# Patient Record
Sex: FEMALE | Race: WHITE | Employment: FULL TIME | URBAN - METROPOLITAN AREA
[De-identification: names, ages, dates, MRNs, and addresses within clinical notes are randomized per-mention and may not be internally consistent; named-entity substitution may affect disease eponyms.]

---

## 2017-02-10 ENCOUNTER — TELEPHONE (OUTPATIENT)
Dept: FAMILY MEDICINE | Facility: CLINIC | Age: 57
End: 2017-02-10

## 2017-02-10 ENCOUNTER — OFFICE VISIT (OUTPATIENT)
Dept: FAMILY MEDICINE | Facility: CLINIC | Age: 57
End: 2017-02-10
Payer: COMMERCIAL

## 2017-02-10 VITALS
HEIGHT: 66 IN | BODY MASS INDEX: 20.09 KG/M2 | HEART RATE: 74 BPM | WEIGHT: 125 LBS | TEMPERATURE: 98.4 F | OXYGEN SATURATION: 97 % | SYSTOLIC BLOOD PRESSURE: 130 MMHG | DIASTOLIC BLOOD PRESSURE: 79 MMHG

## 2017-02-10 DIAGNOSIS — Z86.19 HISTORY OF TRAVELER'S DIARRHEA: Primary | ICD-10-CM

## 2017-02-10 DIAGNOSIS — R09.81 NASAL CONGESTION: ICD-10-CM

## 2017-02-10 PROCEDURE — 99213 OFFICE O/P EST LOW 20 MIN: CPT | Performed by: PHYSICIAN ASSISTANT

## 2017-02-10 RX ORDER — AZITHROMYCIN 250 MG/1
TABLET, FILM COATED ORAL
Qty: 6 TABLET | Refills: 0 | Status: SHIPPED | OUTPATIENT
Start: 2017-02-10 | End: 2018-01-08

## 2017-02-10 NOTE — MR AVS SNAPSHOT
"              After Visit Summary   2/10/2017    Angi Ramos    MRN: [id number]           Patient Information     Date Of Birth          1960        Visit Information        Provider Department      2/10/2017 4:30 PM Neha Argueta PA-C [facility]        Today's Diagnoses     History of traveler's diarrhea    -  1     Nasal congestion            Follow-ups after your visit        Who to contact     If you have questions or need follow up information about today's clinic visit or your schedule please contact [facility] directly at [phone].  Normal or non-critical lab and imaging results will be communicated to you by FlexMinderhart, letter or phone within 4 business days after the clinic has received the results. If you do not hear from us within 7 days, please contact the clinic through FlexMinderhart or phone. If you have a critical or abnormal lab result, we will notify you by phone as soon as possible.  Submit refill requests through Shook or call your pharmacy and they will forward the refill request to us. Please allow 3 business days for your refill to be completed.          Additional Information About Your Visit        MyChart Information     Shook lets you send messages to your doctor, view your test results, renew your prescriptions, schedule appointments and more. To sign up, go to [email or web address] . Click on \"Log in\" on the left side of the screen, which will take you to the Welcome page. Then click on \"Sign up Now\" on the right side of the page.     You will be asked to enter the access code listed below, as well as some personal information. Please follow the directions to create your username and password.     Your access code is: [email or web address]  Expires: 2017  4:45 PM     Your access code will  in 90 days. If you need help or a new code, please call your [facility] or [phone].        Care EveryWhere ID     This is your Care EveryWhere " "ID. This could be used by other organizations to access your [facility] medical records  [id number]        Your Vitals Were     Pulse Temperature Height BMI (Body Mass Index) Pulse Oximetry Last Period    74 98.4  F (36.9  C) (Tympanic) 5' 6\" (1.676 m) 20.19 kg/m2 97% 10/10/2016    Breastfeeding?                   No            Blood Pressure from Last 3 Encounters:   02/10/17 130/79   10/20/16 104/65   09/08/16 112/72    Weight from Last 3 Encounters:   02/10/17 125 lb (56.7 kg)   10/20/16 130 lb (58.968 kg)   09/08/16 131 lb 4.8 oz (59.557 kg)              Today, you had the following     No orders found for display         Today's Medication Changes          These changes are accurate as of: 2/10/17  4:45 PM.  If you have any questions, ask your nurse or doctor.               Start taking these medicines.        Dose/Directions    azithromycin 250 MG tablet   Commonly known as:  ZITHROMAX   Used for:  History of traveler's diarrhea, Nasal congestion   Started by:  Neha Argueta PA-C        Two tablets first day, then one tablet daily for four days.   Quantity:  6 tablet   Refills:  0         Stop taking these medicines if you haven't already. Please contact your care team if you have questions.     order for DME   Stopped by:  Neha Argueta PA-C                Where to get your medicines      These medications were sent to [facility]/pharmacy #[id number] - [facility] [address] [address]     Phone:  [phone]    - azithromycin 250 MG tablet             Primary Care Provider Office Phone # Fax #    Cduztntq Fede Lima [phone]       [facility] [address] [facility] [address]  [facility] [address]        Thank you!     Thank you for choosing [facility]  for your care. Our goal is always to provide you with excellent care. Hearing back from our patients is one way we can continue to improve our services. Please take a few " minutes to complete the written survey that you may receive in the mail after your visit with us. Thank you!             Your Updated Medication List - Protect others around you: Learn how to safely use, store and throw away your medicines at www.disposemymeds.org.          This list is accurate as of: 2/10/17  4:45 PM.  Always use your most recent med list.                   Brand Name Dispense Instructions for use    azithromycin 250 MG tablet    ZITHROMAX    6 tablet    Two tablets first day, then one tablet daily for four days.       COMBIPATCH 0.05-0.14 MG/DAY BIW patch   Generic drug:  estradiol-norethindrone     24 patch    PLACE ONE PATCH ON SKIN TWICE A WEEK

## 2017-02-10 NOTE — TELEPHONE ENCOUNTER
Reason for call:  Patient reporting a symptom    Symptom or request: Congested in head/ears runny nose  Little bit of sore throat    Duration (how long have symptoms been present): few days      Additional comments: Pt just returned from [facility]- Pt also   Has apt today 2/10 at 4:30 with Neha Argueta    Phone Number patient can be reached at:  Cell number on file:    Telephone Information:   Mobile [phone]       Best Time:  anytime    Can we leave a detailed message on this number:  YES    Call taken on 2/10/2017 at 1:42 PM by Krystle Joe

## 2017-02-10 NOTE — TELEPHONE ENCOUNTER
PCP: ALVARADO    Just came back from [facility] yesterday.  Onset of sinus congestion 2 days ago.   Sinus drainage is clear.  Has pain behind the eyes and in the cheeks, mostly on the left side. Rates the pain 8/10.   Denies fever  Tired, worn out.   Sore throat this morning, better now.  Denies cough  Taking Advil decongestant which helps briefly.  Took a Benadyl when she was out of the country.    Has appointment scheduled with TEAM at 4:30 today.    Marifer Kirby RN

## 2017-02-10 NOTE — PROGRESS NOTES
"HPI: This is a 57 yo female here with nasal congestion and sinus pressure x 2d  She returned from [facility] yesterday and sxs started then  She feels her sxs due to the pollution in the city  She is taking advil for sinus pain  Also taking benadryl which doesn't help for very long  She is having clear nasal drainage  Denies cough, sore throat or fever.  Requests abx to carry when she return to [facility] in case of travelers diarrhea.    No past medical history on file.  No past surgical history on file.  Social History   Substance Use Topics     Smoking status: Never Smoker      Smokeless tobacco: Never Used     Alcohol Use: Yes      Comment: 1 glass of wine a day     Current Outpatient Prescriptions   Medication Sig Dispense Refill     azithromycin (ZITHROMAX) 250 MG tablet Two tablets first day, then one tablet daily for four days. 6 tablet 0     COMBIPATCH 0.05-0.14 MG/DAY PLACE ONE PATCH ON SKIN TWICE A WEEK 24 patch 3     Allergies   Allergen Reactions     Ciprofloxacin Cramps     Penicillins      Amoxicillin Rash     Clindamycin Hcl Rash       PHYSICAL EXAM:    /79 mmHg  Pulse 74  Temp(Src) 98.4  F (36.9  C) (Tympanic)  Ht 5' 6\" (1.676 m)  Wt 125 lb (56.7 kg)  BMI 20.19 kg/m2  SpO2 97%  LMP 10/10/2016  Breastfeeding? No    Patient appears non toxic  Ears: bilat serous effusions  Nose: no erythema, + clear drainage  Throat: no erythema or exudates.  Lungs: CTA bilat  Heart: RRR    Assessment and Plan:     (Z86.19) History of traveler's diarrhea  (primary encounter diagnosis)  Comment:   Plan: azithromycin (ZITHROMAX) 250 MG tablet        1000mg x one dose prn    (R09.81) Nasal congestion  Comment: suspect due to irritants  Plan: Recd zyrtec 10mg qhs, sudafed prn, advil for sinus and ear pressure. Add Flonase if needed              Neha Argueta PA-C      "

## 2018-01-08 ENCOUNTER — OFFICE VISIT (OUTPATIENT)
Dept: FAMILY MEDICINE | Facility: CLINIC | Age: 58
End: 2018-01-08
Payer: COMMERCIAL

## 2018-01-08 VITALS
WEIGHT: 124.8 LBS | OXYGEN SATURATION: 98 % | BODY MASS INDEX: 20.06 KG/M2 | HEIGHT: 66 IN | DIASTOLIC BLOOD PRESSURE: 77 MMHG | SYSTOLIC BLOOD PRESSURE: 135 MMHG | TEMPERATURE: 97.3 F | HEART RATE: 58 BPM

## 2018-01-08 DIAGNOSIS — R30.0 DYSURIA: ICD-10-CM

## 2018-01-08 DIAGNOSIS — N39.0 URINARY TRACT INFECTION WITHOUT HEMATURIA, SITE UNSPECIFIED: Primary | ICD-10-CM

## 2018-01-08 LAB
ALBUMIN UR-MCNC: ABNORMAL MG/DL
APPEARANCE UR: CLEAR
BACTERIA #/AREA URNS HPF: ABNORMAL /HPF
BILIRUB UR QL STRIP: NEGATIVE
COLOR UR AUTO: YELLOW
GLUCOSE UR STRIP-MCNC: NEGATIVE MG/DL
HGB UR QL STRIP: ABNORMAL
KETONES UR STRIP-MCNC: NEGATIVE MG/DL
LEUKOCYTE ESTERASE UR QL STRIP: ABNORMAL
NITRATE UR QL: NEGATIVE
NON-SQ EPI CELLS #/AREA URNS LPF: ABNORMAL /LPF
PH UR STRIP: 6.5 PH (ref 5–7)
RBC #/AREA URNS AUTO: ABNORMAL /HPF
SOURCE: ABNORMAL
SP GR UR STRIP: 1.01 (ref 1–1.03)
UROBILINOGEN UR STRIP-ACNC: 0.2 EU/DL (ref 0.2–1)
WBC #/AREA URNS AUTO: ABNORMAL /HPF

## 2018-01-08 PROCEDURE — 87186 SC STD MICRODIL/AGAR DIL: CPT | Performed by: NURSE PRACTITIONER

## 2018-01-08 PROCEDURE — 81001 URINALYSIS AUTO W/SCOPE: CPT | Performed by: NURSE PRACTITIONER

## 2018-01-08 PROCEDURE — 87088 URINE BACTERIA CULTURE: CPT | Performed by: NURSE PRACTITIONER

## 2018-01-08 PROCEDURE — 87086 URINE CULTURE/COLONY COUNT: CPT | Performed by: NURSE PRACTITIONER

## 2018-01-08 PROCEDURE — 99213 OFFICE O/P EST LOW 20 MIN: CPT | Performed by: NURSE PRACTITIONER

## 2018-01-08 RX ORDER — SULFAMETHOXAZOLE/TRIMETHOPRIM 800-160 MG
1 TABLET ORAL 2 TIMES DAILY
Qty: 6 TABLET | Refills: 0 | Status: SHIPPED | OUTPATIENT
Start: 2018-01-08 | End: 2018-01-11

## 2018-01-08 NOTE — PROGRESS NOTES
"HPI    URINARY TRACT SYMPTOMS  Onset: x 1 days     Description:   Painful urination (Dysuria): YES - lots of pressure   Blood in urine (Hematuria): YES  Delay in urine (Hesitency): no     Intensity: mild    Progression of Symptoms:  improving    Accompanying Signs & Symptoms:  Fever/chills: no   Flank pain no   Nausea and vomiting: no   Any vaginal symptoms: none  Abdominal/Pelvic Pain: no     History:   History of frequent UTI's: no   History of kidney stones: no   Sexually Active: Yes  Possibility of pregnancy: No    Precipitating factors:   None  Therapies Tried and outcome: Advil cold and sinus - shows mild improvement   Pt states that she travels a lot   She just had a new partner last weekend. Used protection.        History reviewed. No pertinent past medical history.  History reviewed. No pertinent surgical history.  Social History   Substance Use Topics     Smoking status: Never Smoker     Smokeless tobacco: Never Used     Alcohol use Yes      Comment: 1 glass of wine a day     No current outpatient prescriptions on file.     Allergies   Allergen Reactions     Ciprofloxacin Cramps     Penicillins      Amoxicillin Rash     Clindamycin Hcl Rash       Reviewed and updated as needed this visit by clinical staff and provider      ROS  Detailed as above       /77 (BP Location: Right arm, Patient Position: Chair, Cuff Size: Adult Regular)  Pulse 58  Temp 97.3  F (36.3  C) (Oral)  Ht 5' 6\" (1.676 m)  Wt 124 lb 12.8 oz (56.6 kg)  LMP  (Approximate)  SpO2 98%  Breastfeeding? No  BMI 20.14 kg/m2      Physical Exam   Constitutional: She is well-developed, well-nourished, and in no distress.   HENT:   Head: Normocephalic.   Eyes: Conjunctivae are normal.   Pulmonary/Chest: Effort normal.   Neurological: She is alert.   Psychiatric: Mood and affect normal.   Vitals reviewed.      Assessment and Plan:       ICD-10-CM    1. Urinary tract infection without hematuria, site unspecified N39.0 " sulfamethoxazole-trimethoprim (BACTRIM DS/SEPTRA DS) 800-160 MG per tablet   2. Dysuria R30.0 *UA reflex to Microscopic and Culture ([facility] [facility] (except [place] and [place])     Urine Culture Aerobic Bacterial     Urine Microscopic       Positive UA    Will treat   Push fluids   Discussed home hygiene   Culture pending   Call or rtc prn       Fredy Santana APRN, CNP  [facility]

## 2018-01-08 NOTE — PATIENT INSTRUCTIONS
Understanding Urinary Tract Infections (UTIs)  Most UTIs are caused by bacteria, although they may also be caused by viruses or fungi. Bacteria from the bowel are the most common source of infection. The infection may start because of any of the following:    Sexual activity. During sex, bacteria can travel from the penis, vagina, or rectum into the urethra.     Bacteria on the skin outside the rectum may travel into the urethra. This is more common in women since the rectum and urethra are closer to each other than in men. Wiping from front to back after using the toilet and keeping the area clean can help prevent germs from getting to the urethra.    Blockage of urine flow through the urinary tract. If urine sits too long, germs may start to grow out of control.      Parts of the urinary tract  The infection can occur in any part of the urinary tract.    The kidneys collect and store urine.    The ureters carry urine from the kidneys to the bladder.    The bladder holds urine until you are ready to let it out.    The urethra carries urine from the bladder out of the body. It is shorter in women, so bacteria can move through it more easily. The urethra is longer in men, so a UTI is less likely to reach the bladder or kidneys in men.  Date Last Reviewed: 1/1/2017 2000-2017 [employer]. [address] [place] [address], [place] [address]. All rights reserved. This information is not intended as a substitute for professional medical care. Always follow your healthcare professional's instructions.

## 2018-01-08 NOTE — NURSING NOTE
"Chief Complaint   Patient presents with     UTI       Initial /77 (BP Location: Right arm, Patient Position: Chair, Cuff Size: Adult Regular)  Pulse 58  Temp 97.3  F (36.3  C) (Oral)  Ht 5' 6\" (1.676 m)  Wt 124 lb 12.8 oz (56.6 kg)  LMP  (Approximate)  SpO2 98%  Breastfeeding? No  BMI 20.14 kg/m2 Estimated body mass index is 20.14 kg/(m^2) as calculated from the following:    Height as of this encounter: 5' 6\" (1.676 m).    Weight as of this encounter: 124 lb 12.8 oz (56.6 kg).  Medication Reconciliation: complete     Ijeoma Charles MA     "

## 2018-01-10 LAB
BACTERIA SPEC CULT: ABNORMAL
BACTERIA SPEC CULT: ABNORMAL
SPECIMEN SOURCE: ABNORMAL

## 2018-01-10 RX ORDER — CEPHALEXIN 500 MG/1
500 CAPSULE ORAL 4 TIMES DAILY
Qty: 12 CAPSULE | Refills: 0 | Status: SHIPPED | OUTPATIENT
Start: 2018-01-10 | End: 2018-01-13

## 2018-03-19 ENCOUNTER — OFFICE VISIT (OUTPATIENT)
Dept: FAMILY MEDICINE | Facility: CLINIC | Age: 58
End: 2018-03-19
Payer: COMMERCIAL

## 2018-03-19 VITALS
SYSTOLIC BLOOD PRESSURE: 128 MMHG | HEIGHT: 65 IN | TEMPERATURE: 98.5 F | OXYGEN SATURATION: 97 % | WEIGHT: 130 LBS | HEART RATE: 66 BPM | DIASTOLIC BLOOD PRESSURE: 76 MMHG | RESPIRATION RATE: 16 BRPM | BODY MASS INDEX: 21.66 KG/M2

## 2018-03-19 DIAGNOSIS — Z00.00 ROUTINE GENERAL MEDICAL EXAMINATION AT A HEALTH CARE FACILITY: Primary | ICD-10-CM

## 2018-03-19 DIAGNOSIS — R53.83 FATIGUE, UNSPECIFIED TYPE: ICD-10-CM

## 2018-03-19 LAB
ALBUMIN SERPL-MCNC: 4.2 G/DL (ref 3.4–5)
ALP SERPL-CCNC: 82 U/L (ref 40–150)
ALT SERPL W P-5'-P-CCNC: 18 U/L (ref 0–50)
ANION GAP SERPL CALCULATED.3IONS-SCNC: 9 MMOL/L (ref 3–14)
AST SERPL W P-5'-P-CCNC: 25 U/L (ref 0–45)
BILIRUB SERPL-MCNC: 0.4 MG/DL (ref 0.2–1.3)
BUN SERPL-MCNC: 12 MG/DL (ref 7–30)
CALCIUM SERPL-MCNC: 9.5 MG/DL (ref 8.5–10.1)
CHLORIDE SERPL-SCNC: 104 MMOL/L (ref 94–109)
CHOLEST SERPL-MCNC: 286 MG/DL
CO2 SERPL-SCNC: 26 MMOL/L (ref 20–32)
CREAT SERPL-MCNC: 0.63 MG/DL (ref 0.52–1.04)
ERYTHROCYTE [DISTWIDTH] IN BLOOD BY AUTOMATED COUNT: 14.3 % (ref 10–15)
GFR SERPL CREATININE-BSD FRML MDRD: >90 ML/MIN/1.7M2
GLUCOSE SERPL-MCNC: 88 MG/DL (ref 70–99)
HCT VFR BLD AUTO: 42.3 % (ref 35–47)
HDLC SERPL-MCNC: 94 MG/DL
HGB BLD-MCNC: 14.1 G/DL (ref 11.7–15.7)
LDLC SERPL CALC-MCNC: 182 MG/DL
MCH RBC QN AUTO: 30.2 PG (ref 26.5–33)
MCHC RBC AUTO-ENTMCNC: 33.3 G/DL (ref 31.5–36.5)
MCV RBC AUTO: 91 FL (ref 78–100)
NONHDLC SERPL-MCNC: 192 MG/DL
PLATELET # BLD AUTO: 405 10E9/L (ref 150–450)
POTASSIUM SERPL-SCNC: 4.4 MMOL/L (ref 3.4–5.3)
PROT SERPL-MCNC: 8 G/DL (ref 6.8–8.8)
RBC # BLD AUTO: 4.67 10E12/L (ref 3.8–5.2)
SODIUM SERPL-SCNC: 139 MMOL/L (ref 133–144)
T4 FREE SERPL-MCNC: 0.94 NG/DL (ref 0.76–1.46)
TRIGL SERPL-MCNC: 50 MG/DL
TSH SERPL DL<=0.005 MIU/L-ACNC: 4.6 MU/L (ref 0.4–4)
WBC # BLD AUTO: 10.2 10E9/L (ref 4–11)

## 2018-03-19 PROCEDURE — 86803 HEPATITIS C AB TEST: CPT | Performed by: NURSE PRACTITIONER

## 2018-03-19 PROCEDURE — 85027 COMPLETE CBC AUTOMATED: CPT | Performed by: NURSE PRACTITIONER

## 2018-03-19 PROCEDURE — 99396 PREV VISIT EST AGE 40-64: CPT | Performed by: NURSE PRACTITIONER

## 2018-03-19 PROCEDURE — 84439 ASSAY OF FREE THYROXINE: CPT | Performed by: NURSE PRACTITIONER

## 2018-03-19 PROCEDURE — 84443 ASSAY THYROID STIM HORMONE: CPT | Performed by: NURSE PRACTITIONER

## 2018-03-19 PROCEDURE — 80061 LIPID PANEL: CPT | Performed by: NURSE PRACTITIONER

## 2018-03-19 PROCEDURE — 36415 COLL VENOUS BLD VENIPUNCTURE: CPT | Performed by: NURSE PRACTITIONER

## 2018-03-19 PROCEDURE — 80053 COMPREHEN METABOLIC PANEL: CPT | Performed by: NURSE PRACTITIONER

## 2018-03-19 NOTE — PROGRESS NOTES
SUBJECTIVE:   CC: Angi Ramos is an 57 year old woman who presents for preventive health visit.     Physical   Annual:     Getting at least 3 servings of Calcium per day::  Yes    Bi-annual eye exam::  Yes    Dental care twice a year::  Yes    Sleep apnea or symptoms of sleep apnea::  None    Diet::  Regular (no restrictions)    Frequency of exercise::  4-5 days/week    Duration of exercise::  Greater than 60 minutes    Taking medications regularly::  No    Barriers to taking medications::  Other          Patient was 20 minutes late for her appointment. Here for a complete physical. Travels doing mission trips and has not been feeling well for the past two months. Has a lot of stress with adult daughter who is mentally ill. Active working out daily and feels she has had a fever for two months. Is concerned about hepatitis C.           Today's PHQ-2 Score:   PHQ-2 ( 1999 Pf[employer]) 3/19/2018   Q1: Little interest or pleasure in doing things 0   Q2: Feeling down, depressed or hopeless 1   PHQ-2 Score 1   Q1: Little interest or pleasure in doing things Not at all   Q2: Feeling down, depressed or hopeless Several days   PHQ-2 Score 1       Abuse: Current or Past(Physical, Sexual or Emotional)- No  Do you feel safe in your environment - Yes    Social History   Substance Use Topics     Smoking status: Never Smoker     Smokeless tobacco: Never Used     Alcohol use Yes      Comment: 1 glass of wine a day     No flowsheet data found.    Reviewed orders with patient.  Reviewed health maintenance and updated orders accordingly - Yes      Patient over age 50, mutual decision to screen reflected in health maintenance.    Pertinent mammograms are reviewed under the imaging tab.  History of abnormal Pap smear: NO - age 30-65 PAP every 5 years with negative HPV co-testing recommended    Reviewed and updated as needed this visit by clinical staff  Tobacco  Allergies  Meds  Problems  Med Hx  Surg Hx  Fam Hx  Soc Hx  "         Reviewed and updated as needed this visit by Provider  Allergies  Meds  Problems            Review of Systems  C: NEGATIVE for fever, chills, change in weight  I: NEGATIVE for worrisome rashes, moles or lesions  E: NEGATIVE for vision changes or irritation  ENT: NEGATIVE for ear, mouth and throat problems  R: NEGATIVE for significant cough or SOB  B: NEGATIVE for masses, tenderness or discharge  CV: NEGATIVE for chest pain, palpitations or peripheral edema  GI: NEGATIVE for nausea, abdominal pain, heartburn, or change in bowel habits  : NEGATIVE for unusual urinary or vaginal symptoms. No vaginal bleeding.  M: NEGATIVE for significant arthralgias or myalgia  N: NEGATIVE for weakness, dizziness or paresthesias  P: NEGATIVE for changes in mood or affect      OBJECTIVE:   /76 (BP Location: Right arm, Patient Position: Chair, Cuff Size: Adult Regular)  Pulse 66  Temp 98.5  F (36.9  C) (Oral)  Resp 16  Ht 5' 5\" (1.651 m)  Wt 130 lb (59 kg)  LMP 10/10/2016  SpO2 97%  Breastfeeding? No  BMI 21.63 kg/m2  Physical Exam  GENERAL: healthy, alert and no distress  EYES: Eyes grossly normal to inspection, PERRL and conjunctivae and sclerae normal  HENT: ear canals and TM's normal, nose and mouth without ulcers or lesions  NECK: no adenopathy, no asymmetry, masses, or scars and thyroid normal to palpation  RESP: lungs clear to auscultation - no rales, rhonchi or wheezes  CV: regular rate and rhythm, normal S1 S2, no S3 or S4, no murmur, click or rub, no peripheral edema and peripheral pulses strong  ABDOMEN: soft, nontender, no hepatosplenomegaly, no masses and bowel sounds normal  MS: no gross musculoskeletal defects noted, no edema  SKIN: no suspicious lesions or rashes  PSYCH: mentation appears normal and anxious    ASSESSMENT/PLAN:   1. Routine general medical examination at a health care facility  Fasting labs drawn today.   - Comprehensive metabolic panel  - Lipid panel reflex to direct LDL " "Fasting  -  2. Fatigue, unspecified type   TSH with free T4 reflex  - Hepatitis C antibody  - CBC with platelets  - T4 free        COUNSELING:  Reviewed preventive health counseling, as reflected in patient instructions       Regular exercise       Healthy diet/nutrition         reports that she has never smoked. She has never used smokeless tobacco.    Estimated body mass index is 21.63 kg/(m^2) as calculated from the following:    Height as of this encounter: 5' 5\" (1.651 m).    Weight as of this encounter: 130 lb (59 kg).       Counseling Resources:  ATP IV Guidelines  Pooled Cohorts Equation Calculator  Breast Cancer Risk Calculator  FRAX Risk Assessment  ICSI Preventive Guidelines  Dietary Guidelines for Americans, 2010  [employer] App's MyPlate  ASA Prophylaxis  Lung CA Screening    Jeannette Chavez NP  [facility]  Answers for HPI/ROS submitted by the patient on 3/19/2018   PHQ-2 Score: 1    "

## 2018-03-19 NOTE — NURSING NOTE
"Chief Complaint   Patient presents with     Physical     only tea today, not feeling well for 1 month       Initial /76 (BP Location: Right arm, Patient Position: Chair, Cuff Size: Adult Regular)  Pulse 66  Temp 98.5  F (36.9  C) (Oral)  Resp 16  Ht 5' 5\" (1.651 m)  Wt 130 lb (59 kg)  LMP 10/10/2016  SpO2 97%  Breastfeeding? No  BMI 21.63 kg/m2 Estimated body mass index is 21.63 kg/(m^2) as calculated from the following:    Height as of this encounter: 5' 5\" (1.651 m).    Weight as of this encounter: 130 lb (59 kg).  Medication Reconciliation: vanessa Parikh CMA      "

## 2018-03-19 NOTE — LETTER
[facility]          [address] [place] [address]                                                                                                       [phone]      Angi Ramos  [address]  [facility] [address]      Dear Reena Whitley is a copy of the results that we discussed over the phone.      Thank you for choosing [facility]. We appreciate the opportunity to serve you and look forward to supporting your healthcare needs in the future.    If you have any questions or concerns, please call me or my nurse at [phone].        Sincerely,          Jeannette Chavez NP                        Results for orders placed or performed in visit on 03/19/18   Comprehensive metabolic panel   Result Value Ref Range    Sodium 139 133 - 144 mmol/L    Potassium 4.4 3.4 - 5.3 mmol/L    Chloride 104 94 - 109 mmol/L    Carbon Dioxide 26 20 - 32 mmol/L    Anion Gap 9 3 - 14 mmol/L    Glucose 88 70 - 99 mg/dL    Urea Nitrogen 12 7 - 30 mg/dL    Creatinine 0.63 0.52 - 1.04 mg/dL    GFR Estimate >90 >60 mL/min/1.7m2    GFR Estimate If Black >90 >60 mL/min/1.7m2    Calcium 9.5 8.5 - 10.1 mg/dL    Bilirubin Total 0.4 0.2 - 1.3 mg/dL    Albumin 4.2 3.4 - 5.0 g/dL    Protein Total 8.0 6.8 - 8.8 g/dL    Alkaline Phosphatase 82 40 - 150 U/L    ALT 18 0 - 50 U/L    AST 25 0 - 45 U/L   Lipid panel reflex to direct LDL Fasting   Result Value Ref Range    Cholesterol 286 (H) <200 mg/dL    Triglycerides 50 <150 mg/dL    HDL Cholesterol 94 >49 mg/dL    LDL Cholesterol Calculated 182 (H) <100 mg/dL    Non HDL Cholesterol 192 (H) <130 mg/dL   TSH with free T4 reflex   Result Value Ref Range    TSH 4.60 (H) 0.40 - 4.00 mU/L   Hepatitis C antibody   Result Value Ref Range    Hepatitis C Antibody Nonreactive NR^Nonreactive   CBC with platelets   Result Value Ref Range    WBC 10.2 4.0 - 11.0 10e9/L    RBC Count 4.67 3.8 - 5.2 10e12/L    Hemoglobin 14.1 11.7 - 15.7  g/dL    Hematocrit 42.3 35.0 - 47.0 %    MCV 91 78 - 100 fl    MCH 30.2 26.5 - 33.0 pg    MCHC 33.3 31.5 - 36.5 g/dL    RDW 14.3 10.0 - 15.0 %    Platelet Count 405 150 - 450 10e9/L   T4 free   Result Value Ref Range    T4 Free 0.94 0.76 - 1.46 ng/dL

## 2018-03-20 LAB — HCV AB SERPL QL IA: NONREACTIVE

## 2018-04-11 ENCOUNTER — TELEPHONE (OUTPATIENT)
Dept: NURSING | Facility: CLINIC | Age: 58
End: 2018-04-11

## 2018-04-11 NOTE — TELEPHONE ENCOUNTER
"Pt calling stating over the last couple days her eye has become swollen and black and blue \"like I got punched in the eye.\"  She does have c/o sinus drainage and feels this could be related.  Advised that she needs to be seen due to the swelling on one side and bruising to rule out an infection like cellulitis.  Pt was not pleased with this recommended and wants to be seen before 3 pm.  She is currently leaving [place] and works in [employer].  Advised she can be seen in the [place] or [facility] but unsure of other [facility] with her current location.  \"Can I be seen in a minute clinic?\"  Advised yes but again unsure of what location would be convenient for her due to her current location.    Brittany Albright RN, BSN    "

## 2018-11-14 ENCOUNTER — APPOINTMENT (OUTPATIENT)
Dept: GENERAL RADIOLOGY | Facility: CLINIC | Age: 58
End: 2018-11-14
Attending: NURSE PRACTITIONER
Payer: COMMERCIAL

## 2018-11-14 ENCOUNTER — HOSPITAL ENCOUNTER (EMERGENCY)
Facility: CLINIC | Age: 58
Discharge: HOME | End: 2018-11-14
Attending: NURSE PRACTITIONER | Admitting: NURSE PRACTITIONER
Payer: COMMERCIAL

## 2018-11-14 VITALS
DIASTOLIC BLOOD PRESSURE: 90 MMHG | OXYGEN SATURATION: 95 % | SYSTOLIC BLOOD PRESSURE: 127 MMHG | HEIGHT: 65 IN | WEIGHT: 120 LBS | BODY MASS INDEX: 19.99 KG/M2 | RESPIRATION RATE: 13 BRPM | TEMPERATURE: 98.4 F | HEART RATE: 69 BPM

## 2018-11-14 DIAGNOSIS — S52.502A CLOSED FRACTURE OF DISTAL END OF LEFT RADIUS, UNSPECIFIED FRACTURE MORPHOLOGY, INITIAL ENCOUNTER: ICD-10-CM

## 2018-11-14 PROCEDURE — 96376 TX/PRO/DX INJ SAME DRUG ADON: CPT

## 2018-11-14 PROCEDURE — 25600 CLTX DST RDL FX/EPHYS SEP WO: CPT | Mod: LT

## 2018-11-14 PROCEDURE — 96374 THER/PROPH/DIAG INJ IV PUSH: CPT

## 2018-11-14 PROCEDURE — 96375 TX/PRO/DX INJ NEW DRUG ADDON: CPT

## 2018-11-14 PROCEDURE — 73110 X-RAY EXAM OF WRIST: CPT | Mod: LT

## 2018-11-14 PROCEDURE — 99285 EMERGENCY DEPT VISIT HI MDM: CPT | Mod: 25

## 2018-11-14 PROCEDURE — 73080 X-RAY EXAM OF ELBOW: CPT | Mod: LT

## 2018-11-14 PROCEDURE — 25000128 H RX IP 250 OP 636: Performed by: NURSE PRACTITIONER

## 2018-11-14 RX ORDER — HYDROCODONE BITARTRATE AND ACETAMINOPHEN 5; 325 MG/1; MG/1
1 TABLET ORAL EVERY 6 HOURS PRN
Qty: 10 TABLET | Refills: 0 | Status: SHIPPED | OUTPATIENT
Start: 2018-11-14

## 2018-11-14 RX ORDER — ONDANSETRON 2 MG/ML
4 INJECTION INTRAMUSCULAR; INTRAVENOUS ONCE
Status: COMPLETED | OUTPATIENT
Start: 2018-11-14 | End: 2018-11-14

## 2018-11-14 RX ORDER — ASPIRIN 81 MG
100 TABLET, DELAYED RELEASE (ENTERIC COATED) ORAL DAILY
Qty: 60 TABLET | Refills: 1 | Status: SHIPPED | OUTPATIENT
Start: 2018-11-14

## 2018-11-14 RX ORDER — MORPHINE SULFATE 2 MG/ML
2 INJECTION, SOLUTION INTRAMUSCULAR; INTRAVENOUS
Status: DISCONTINUED | OUTPATIENT
Start: 2018-11-14 | End: 2018-11-15 | Stop reason: HOSPADM

## 2018-11-14 RX ORDER — MORPHINE SULFATE 4 MG/ML
4 INJECTION, SOLUTION INTRAMUSCULAR; INTRAVENOUS ONCE
Status: COMPLETED | OUTPATIENT
Start: 2018-11-14 | End: 2018-11-14

## 2018-11-14 RX ADMIN — MORPHINE SULFATE 4 MG: 4 INJECTION INTRAVENOUS at 21:55

## 2018-11-14 RX ADMIN — ONDANSETRON 4 MG: 2 INJECTION INTRAMUSCULAR; INTRAVENOUS at 21:55

## 2018-11-14 RX ADMIN — MORPHINE SULFATE 2 MG: 2 INJECTION, SOLUTION INTRAMUSCULAR; INTRAVENOUS at 22:46

## 2018-11-14 ASSESSMENT — ENCOUNTER SYMPTOMS
ARTHRALGIAS: 1
HEADACHES: 0
NECK PAIN: 0
NECK STIFFNESS: 0
WOUND: 0
MYALGIAS: 1
JOINT SWELLING: 1

## 2018-11-14 NOTE — LETTER
November 14, 2018      To Whom It May Concern:      Angi Ramos was seen in our Emergency Department today, 11/14/18. Please excuse Angi from work on 11/15, 11/16, and 11/17 due to injury.  She may return to work when improved.    Sincerely,        China Minor, CNP

## 2018-11-14 NOTE — ED AVS SNAPSHOT
Emergency Department    [address] [facility] [address]    Phone:  [phone]    Fax:  [phone]                                       Angi Ramos   MRN: [id number]    Department:   Emergency Department   Date of Visit:  11/14/2018           After Visit Summary Signature Page     I have received my discharge instructions, and my questions have been answered. I have discussed any challenges I see with this plan with the nurse or doctor.    ..........................................................................................................................................  Patient/Patient Representative Signature      ..........................................................................................................................................  Patient Representative Print Name and Relationship to Patient    ..................................................               ................................................  Date                                   Time    ..........................................................................................................................................  Reviewed by Signature/Title    ...................................................              ..............................................  Date                                               Time          22EPIC Rev 08/18

## 2018-11-14 NOTE — ED AVS SNAPSHOT
Emergency Department    [address] [facility] [address]    Phone:  [phone]    Fax:  [phone]                                       Angi Ramos   MRN: [id number]    Department:   Emergency Department   Date of Visit:  11/14/2018           Patient Information     Date Of Birth          1960        Your diagnoses for this visit were:     Closed fracture of distal end of left radius, unspecified fracture morphology, initial encounter There is a fracture of the distal radius. No significant displacement or angulation is seen       You were seen by China Minor, CNP.      Follow-up Information     Follow up with Ken Guardado MD In 2 days.    Specialty:  Orthopaedic Surgery    Contact information:    [facility]  [address] [facility] [address]  [phone]          Follow up with Donte Zavala MD In 3 days.    Specialty:  Internal Medicine    Why:  As needed or with your primary care    Contact information:    [address] [facility] [place] [address] [address]  [phone]          Follow up with  Emergency Department.    Specialty:  EMERGENCY MEDICINE    Why:  As needed, If symptoms worsen    Contact information:    [address] [facility] [address] [phone]        Discharge Instructions       Forearm Fracture  You have a break or fracture of both bones in the forearm. The bones are not out of place and will not need to be set. This fracture usually takes 6 to 12 weeks to heal completely. Initial treatment is with a splint or cast.    Home care    Keep your arm raised to reduce pain and swelling. When sitting or lying down, raise your arm above heart level. You can do this by placing your arm on a pillow that rests on your chest or on a pillow at your side. This is most important during the first 48 hours after injury.    Apply an ice pack over the injured area for 15 to 20 minutes every 3 to 6 hours. You should do this for the first 24  to 48 hours. To make a cold pack, put ice cubes in a plastic bag that seals at the top. Wrap the bag in a clean, thin towel or cloth. Never put ice or an ice pack directly on the skin. As the ice melts, be careful that the cast or splint doesn t get wet. You can place the ice pack inside the sling and directly over the splint or cast. Keep using ice packs as needed to ease pain and swelling.    Keep the cast or splint completely dry at all times. Bathe with your cast or splint out of the water. Protect it with 2 large plastic bags, one outside of the other, each taped with duct tape at the top end or use rubber bands. If a fiberglass splint or cast gets wet, you can dry it with a hair dryer on a cool setting.    You may use over-the-counter pain medicine to control pain, unless another pain medicine was prescribed. If you have chronic liver or kidney disease or ever had a stomach ulcer or GI bleeding, talk with your healthcare provider before using these medicines.  Follow-up care  Follow up with your healthcare provider, or as advised. If a splint was applied, it may be changed to a cast during your follow-up visit.  If X-rays were taken, you will be told of any new findings that may affect your care.  When to seek medical advice  Call your healthcare provider right away if any of the following occur:    The plaster cast or splint becomes wet or soft    The fiberglass cast or splint remains wet for more than 24 hours    Increased tightness, looseness, or pain occurs under the cast or splint    Fingers become swollen, cold, blue, numb or tingly    The cast or splint develops a bad odor  Date Last Reviewed: 4/1/2018 2000-2018 [employer]. [address] [place] [address], [place] [address]. All rights reserved. This information is not intended as a substitute for professional medical care. Always follow your healthcare professional's instructions.        Discharge Instructions: Caring for Your Splint  You will  be going home with a splint. This is sometimes called a removable cast. A splint helps your body heal by holding your injured bones or joints in place. Take good care of your splint. A damaged splint can keep your injury from healing well. If your splint becomes damaged or loses its shape, you may need to replace it.   Home care    Wear your splint according to your doctor s instructions.    Keep the splint dry at all times. Bathe with your splint well out of the water. You can hold the splint outside the tub or shower when bathing. Protect it with a large plastic bag closed at the top end with a rubber band. Use two layers of plastic to help keep the splint dry. Or you can buy a waterproof shield.    If a splint gets wet, dry it with a hair dryer on the  cool  setting. Don t use the warm or hot setting, because those settings can burn your skin.    Always keep the splint clean and away from dirt.    Wash the Velcro straps and inner cloth sleeve (stockinet) with soapy water and air dry.    Keep your splint away from open flames.    Don t expose your splint to heat, space heaters, or prolonged sunlight. Excessive heat will cause the splint to change shape.    Don t cut or tear the splint.     Exercise all the nearby joints not kept still by the splint. If you have a long leg splint, exercise your hip joint and your toes. If you have an arm splint, exercise your shoulder, elbow, thumb, and fingers.    Elevate the part of your body that is in the splint. This helps reduce swelling.  Follow-up care  Make a follow-up appointment with your healthcare provider, or as advised.  When to call your healthcare provider  Call your healthcare provider right away if you have any of these:    Tingling or numbness in the affected area    Severe pain that cannot be relieved with medicine    Cast that feels too tight or too loose    Swelling, coldness, or blue-gray color in the fingers or toes    Cast that is damaged, cracked, or has  rough edges that hurt    Pressure sores or red marks that don t go away within 1 hour after removing the splint    Blisters   Date Last Reviewed: 7/1/2016 2000-2018 [employer]. [address] [place] [address], [place] [address]. All rights reserved. This information is not intended as a substitute for professional medical care. Always follow your healthcare professional's instructions.            24 Hour Appointment Hotline       To make an appointment at any [facility], call [phone] ([phone]). If you don't have a family doctor or clinic, we will help you find one. [facility] are conveniently located to serve the needs of you and your family.             Review of your medicines      START taking        Dose / Directions Last dose taken    docusate sodium 100 MG tablet   Commonly known as:  COLACE   Dose:  100 mg   Quantity:  60 tablet        Take 100 mg by mouth daily Take while using Norco   Refills:  1        HYDROcodone-acetaminophen 5-325 MG per tablet   Commonly known as:  NORCO   Dose:  1 tablet   Quantity:  10 tablet        Take 1 tablet by mouth every 6 hours as needed for severe pain No more than 4000mg acetaminophen every 24 hours   Refills:  0                Information about OPIOIDS     PRESCRIPTION OPIOIDS: WHAT YOU NEED TO KNOW   We gave you an opioid (narcotic) pain medicine. It is important to manage your pain, but opioids are not always the best choice. You should first try all the other options your care team gave you. Take this medicine for as short a time (and as few doses) as possible.    Some activities can increase your pain, such as bandage changes or therapy sessions. It may help to take your pain medicine 30 to 60 minutes before these activities. Reduce your stress by getting enough sleep, working on hobbies you enjoy and practicing relaxation or meditation. Talk to your care team about ways to manage your pain beyond prescription opioids.    These  medicines have risks:    DO NOT drive when on new or higher doses of pain medicine. These medicines can affect your alertness and reaction times, and you could be arrested for driving under the influence (DUI). If you need to use opioids long-term, talk to your care team about driving.    DO NOT operate heavy machinery    DO NOT do any other dangerous activities while taking these medicines.    DO NOT drink any alcohol while taking these medicines.     If the opioid prescribed includes acetaminophen, DO NOT take with any other medicines that contain acetaminophen. Read all labels carefully. Look for the word  acetaminophen  or  Tylenol.  Ask your pharmacist if you have questions or are unsure.    You can get addicted to pain medicines, especially if you have a history of addiction (chemical, alcohol or substance dependence). Talk to your care team about ways to reduce this risk.    All opioids tend to cause constipation. Drink plenty of water and eat foods that have a lot of fiber, such as fruits, vegetables, prune juice, apple juice and high-fiber cereal. Take a laxative (Miralax, milk of magnesia, Colace, Senna) if you don t move your bowels at least every other day. Other side effects include upset stomach, sleepiness, dizziness, throwing up, tolerance (needing more of the medicine to have the same effect), physical dependence and slowed breathing.    Store your pills in a secure place, locked if possible. We will not replace any lost or stolen medicine. If you don t finish your medicine, please throw away (dispose) as directed by your pharmacist. The [place] [employer]ution [employer] has more information about safe disposal: https://www.pca.[facility].mn.us/living-green/managing-unwanted-medications        Prescriptions were sent or printed at these locations (2 Prescriptions)                   Other Prescriptions                Printed at Department/Unit printer (2 of 2)         docusate sodium (COLACE) 100 MG  tablet               HYDROcodone-acetaminophen (NORCO) 5-325 MG per tablet                Procedures and tests performed during your visit     Elbow  XR, G/E 3 views, left    Peripheral IV catheter    XR Wrist Left G/E 3 Views      Orders Needing Specimen Collection     None      Pending Results     No orders found from 11/12/2018 to 11/15/2018.            Pending Culture Results     No orders found from 11/12/2018 to 11/15/2018.            Pending Results Instructions     If you had any lab results that were not finalized at the time of your Discharge, you can call the ED Lab Result RN at [phone]. You will be contacted by this team for any positive Lab results or changes in treatment. The nurses are available 7 days a week from 10A to 6:30P.  You can leave a message 24 hours per day and they will return your call.        Test Results From Your Hospital Stay        11/14/2018 10:37 PM      Narrative     WRIST THREE VIEWS LEFT 11/14/2018 10:28 PM     HISTORY: distal radius and ulna pain after FOOSH;     COMPARISON: None.    FINDINGS: There is a fracture of the distal radius. No significant  displacement or angulation is seen..        Impression     IMPRESSION: Fractured distal radius.    CHRIS NICOLAS MD         11/14/2018 10:37 PM      Narrative     ELBOW THREE VIEWS LEFT  11/14/2018 10:28 PM     HISTORY: medial elbow pain after FOOSH;     COMPARISON: None.    FINDINGS: There is normal osseous alignment.  No fractures are  identified.  No elbow joint effusion is seen.        Impression     IMPRESSION: Negative, osseous structures appear intact.    CHRIS NICOLAS MD                Clinical Quality Measure: Blood Pressure Screening     Your blood pressure was checked while you were in the emergency department today. The last reading we obtained was  BP: 127/90 . Please read the guidelines below about what these numbers mean and what you should do about them.  If your systolic blood pressure (the top number) is less than  "120 and your diastolic blood pressure (the bottom number) is less than 80, then your blood pressure is normal. There is nothing more that you need to do about it.  If your systolic blood pressure (the top number) is 120-139 or your diastolic blood pressure (the bottom number) is 80-89, your blood pressure may be higher than it should be. You should have your blood pressure rechecked within a year by a primary care provider.  If your systolic blood pressure (the top number) is 140 or greater or your diastolic blood pressure (the bottom number) is 90 or greater, you may have high blood pressure. High blood pressure is treatable, but if left untreated over time it can put you at risk for heart attack, stroke, or kidney failure. You should have your blood pressure rechecked by a primary care provider within the next 4 weeks.  If your provider in the emergency department today gave you specific instructions to follow-up with your doctor or provider even sooner than that, you should follow that instruction and not wait for up to 4 weeks for your follow-up visit.        Thank you for choosing [facility]       Thank you for choosing [facility] for your care. Our goal is always to provide you with excellent care. Hearing back from our patients is one way we can continue to improve our services. Please take a few minutes to complete the written survey that you may receive in the mail after you visit with us. Thank you!        [employer] Information     Member Desk lets you send messages to your doctor, view your test results, renew your prescriptions, schedule appointments and more. To sign up, go to [email or web address] . Click on \"Log in\" on the left side of the screen, which will take you to the Welcome page. Then click on \"Sign up Now\" on the right side of the page.     You will be asked to enter the access code listed below, as well as some personal information. Please follow the directions to create your username and " password.     Your access code is: [email or web address]  Expires: 2018 10:35 AM     Your access code will  in 90 days. If you need help or a new code, please call your [facility] clinic or [phone].        Care EveryWhere ID     This is your Care EveryWhere ID. This could be used by other organizations to access your [facility] medical records  [id number]        Equal Access to Services     VLAD UGALDE : Ximena flemingo Sososa, waaxda lusarahadaha, qaybta kaalmada adebobbiyada, stephanie diane . So [facility] [phone].    ATENCIÓN: Si habla español, tiene a avila disposición servicios gratuitos de asistencia lingüística. Llame al [phone].    We comply with applicable federal civil rights laws and [place] laws. We do not discriminate on the basis of race, color, national origin, age, disability, sex, sexual orientation, or gender identity.            After Visit Summary       This is your record. Keep this with you and show to your community pharmacist(s) and doctor(s) at your next visit.

## 2018-11-15 NOTE — DISCHARGE INSTRUCTIONS
Forearm Fracture  You have a break or fracture of both bones in the forearm. The bones are not out of place and will not need to be set. This fracture usually takes 6 to 12 weeks to heal completely. Initial treatment is with a splint or cast.    Home care    Keep your arm raised to reduce pain and swelling. When sitting or lying down, raise your arm above heart level. You can do this by placing your arm on a pillow that rests on your chest or on a pillow at your side. This is most important during the first 48 hours after injury.    Apply an ice pack over the injured area for 15 to 20 minutes every 3 to 6 hours. You should do this for the first 24 to 48 hours. To make a cold pack, put ice cubes in a plastic bag that seals at the top. Wrap the bag in a clean, thin towel or cloth. Never put ice or an ice pack directly on the skin. As the ice melts, be careful that the cast or splint doesn t get wet. You can place the ice pack inside the sling and directly over the splint or cast. Keep using ice packs as needed to ease pain and swelling.    Keep the cast or splint completely dry at all times. Bathe with your cast or splint out of the water. Protect it with 2 large plastic bags, one outside of the other, each taped with duct tape at the top end or use rubber bands. If a fiberglass splint or cast gets wet, you can dry it with a hair dryer on a cool setting.    You may use over-the-counter pain medicine to control pain, unless another pain medicine was prescribed. If you have chronic liver or kidney disease or ever had a stomach ulcer or GI bleeding, talk with your healthcare provider before using these medicines.  Follow-up care  Follow up with your healthcare provider, or as advised. If a splint was applied, it may be changed to a cast during your follow-up visit.  If X-rays were taken, you will be told of any new findings that may affect your care.  When to seek medical advice  Call your healthcare provider right away  if any of the following occur:    The plaster cast or splint becomes wet or soft    The fiberglass cast or splint remains wet for more than 24 hours    Increased tightness, looseness, or pain occurs under the cast or splint    Fingers become swollen, cold, blue, numb or tingly    The cast or splint develops a bad odor  Date Last Reviewed: 4/1/2018 2000-2018 [employer]. [address] [place] [address]. All rights reserved. This information is not intended as a substitute for professional medical care. Always follow your healthcare professional's instructions.        Discharge Instructions: Caring for Your Splint  You will be going home with a splint. This is sometimes called a removable cast. A splint helps your body heal by holding your injured bones or joints in place. Take good care of your splint. A damaged splint can keep your injury from healing well. If your splint becomes damaged or loses its shape, you may need to replace it.   Home care    Wear your splint according to your doctor s instructions.    Keep the splint dry at all times. Bathe with your splint well out of the water. You can hold the splint outside the tub or shower when bathing. Protect it with a large plastic bag closed at the top end with a rubber band. Use two layers of plastic to help keep the splint dry. Or you can buy a waterproof shield.    If a splint gets wet, dry it with a hair dryer on the  cool  setting. Don t use the warm or hot setting, because those settings can burn your skin.    Always keep the splint clean and away from dirt.    Wash the Velcro straps and inner cloth sleeve (stockinet) with soapy water and air dry.    Keep your splint away from open flames.    Don t expose your splint to heat, space heaters, or prolonged sunlight. Excessive heat will cause the splint to change shape.    Don t cut or tear the splint.     Exercise all the nearby joints not kept still by the splint. If you have a long  leg splint, exercise your hip joint and your toes. If you have an arm splint, exercise your shoulder, elbow, thumb, and fingers.    Elevate the part of your body that is in the splint. This helps reduce swelling.  Follow-up care  Make a follow-up appointment with your healthcare provider, or as advised.  When to call your healthcare provider  Call your healthcare provider right away if you have any of these:    Tingling or numbness in the affected area    Severe pain that cannot be relieved with medicine    Cast that feels too tight or too loose    Swelling, coldness, or blue-gray color in the fingers or toes    Cast that is damaged, cracked, or has rough edges that hurt    Pressure sores or red marks that don t go away within 1 hour after removing the splint    Blisters   Date Last Reviewed: 7/1/2016 2000-2018 [employer]. [address] [place] [address]. All rights reserved. This information is not intended as a substitute for professional medical care. Always follow your healthcare professional's instructions.

## 2018-11-15 NOTE — ED PROVIDER NOTES
"  History     Chief Complaint:  Hand Injury    HPI   Angi Ramos is a right-handed 57 year old female, otherwise healthy, who presents for evaluation of left wrist pain. Earlier this evening, the patient states that she was stepping out of her car when she slipped on ice in her driveway, and landed on her left outstretched left wrist and striking her elbow. She denies injury to her head or neck or loss of consciousness. While in the ED she rates this pain at 12/10 in severity, noting radiation into her left elbow as well as tingling in fingers 3 through 5 on the left side. Prior to arrival she took 600 mg of tylenol without relief of pain. No other injury or symptoms reported otherwise.     Allergies:  Ciprofloxacin  Penicillins  Amoxicillin  Clindamycin Hcl    Medications:    The patient is not currently taking any prescribed medications.    Past Medical History:    Anxiety  Pulmonary nodule     Past Surgical History:    History reviewed. No pertinent surgical history.    Family History:    Thyroid disease, mother  Osteoporosis, mother  Diabetes, maternal aunt  Breast cancer, Paternal aunt    Social History:  The patient was accompanied to the ED by her daughter.  Smoking Status: Never  Smokeless Tobacco: Never  Alcohol Use: Yes  Marital Status:  Single     Review of Systems   Musculoskeletal: Positive for arthralgias, joint swelling and myalgias. Negative for neck pain and neck stiffness.   Skin: Negative for wound.   Neurological: Negative for syncope and headaches.     Physical Exam     Patient Vitals for the past 24 hrs:   BP Temp Pulse Resp SpO2 Height Weight   11/14/18 2134 156/79 - - - - - -   11/14/18 2133 - 98.4  F (36.9  C) 68 20 100 % 1.651 m (5' 5\") 54.4 kg (120 lb)     Physical Exam  Nursing notes reviewed. Vitals reviewed.  General: Alert. Well kept.  Eyes:  Conjunctiva non-injected, non-icteric.  Neck/Throat: Moist mucous membranes. Normal voice. No midline cervical spinal tenderness. "   Cardiac: Regular rhythm. Normal heart sounds. 2+ radial pulses bilateral.   Pulmonary: Clear and equal breath sounds bilaterally.   Musculoskeletal: No midline thoracic or lumbar spinal tenderness. Normal gross range of motion of bilateral lower and right upper extremities. Tenderness and swelling over left distal radius and ulna with tenderness to medial elbow. Able to flex and extend at the elbow with supination and pronation restricted by pain. No tenderness to proximal humerus or shoulder.  Able to flex and extend at each finger with increased pain with ROM of ring and little finger. Sensation intact to light touch all 5 fingers. Able to make OK sign, flex and extend at wrist, and has intact finger strength.  Neurological: Alert and oriented x4. GCS 15.  Skin: Warm and dry. Normal appearance of visualized exposed skin without rashes or petechiae.  Psych: Affect normal. Good eye contact.    Emergency Department Course     Imaging:  Radiology findings were communicated with the patient who voiced understanding of the findings.  XR Elbow, G/E 3 Views, Left:  IMPRESSION: Negative, osseous structures appear intact.  Read by radiology    XR Wrist Left G/E 3 Views:  FINDINGS: There is a fracture of the distal radius. No significant  displacement or angulation is seen  Read by radiology    Procedures:    Narrative: Splint placement   Supervised by Dr. Madelin Andersen  Volar short arm splint was applied and after placement I checked and adjusted the fit to ensure proper positioning. Patient was more comfortable with splint in place. Sensation and circulation are intact after splint placement.    Interventions:  2155 - Morphine injection 4 mg IV  2155 - Zofran injection 4 mg IV    Emergency Department Course:  Nursing notes and vitals reviewed.    2142: I performed an exam of the patient as documented above.     2200: I discussed the patient in shared service with Dr. Madelin Garrett.    2225: Patient rechecked and updated.      2300: Dr. Andersen evaluated the patient.    Findings and plan explained to the Patient. Patient discharged home with instructions regarding supportive care, medications, and reasons to return. The importance of close follow-up was reviewed.     Impression & Plan      Medical Decision Making:  Angi Ramos is a 57 year old female who presents for evaluation of left wrist and elbow pain following mechanical fall. On exam she has deformity and tenderness to the distal radius and tenderness to the distal ulna and medial elbow. She has no proximal humerus or shoulder discomfort. Using NEXUS criteria her neck was cleared clinically. Using [place] head CT guidelines there is no indication for head CT today. She does have tingling in her fingers 3-5 but is able to flex and extend although increased pain over the distal ulna with movement of fingers 4 and 5. She has 2 + radial and ulnar pulse. She was sent for x-ray which shows elbow without fracture, dislocation,or effusion.  Wrist xray positive for fracture of the distal radius without significant displacement or angulation. Her pain was treated with morphine IV in the ED.   Splint was placed as documented above and she was placed in a sling for comfort.  She will follow-up with orthopedics in 1-2 days for further evaluation and management. No indication for emergent orthopedic consultation in the ED.     Diagnosis:    ICD-10-CM    1. Closed fracture of distal end of left radius, unspecified fracture morphology, initial encounter S52.502A     There is a fracture of the distal radius. No significant displacement or angulation is seen     Disposition:  discharged to home    Discharge Medications:  New Prescriptions    DOCUSATE SODIUM (COLACE) 100 MG TABLET    Take 100 mg by mouth daily Take while using [place]    HYDROCODONE-ACETAMINOPHEN (NORCO) 5-325 MG PER TABLET    Take 1 tablet by mouth every 6 hours as needed for severe pain No more than 4000mg  acetaminophen every 24 hours     Babita Leon  11/14/2018    EMERGENCY DEPARTMENT  I, Babita Leon, am serving as a scribe at 9:42 PM on 11/14/2018 to document services personally performed by [place] Minor CNP based on my observations and the provider's statements to me.       [place], [place], CNP  11/14/18 2431

## 2018-11-15 NOTE — ED PROVIDER NOTES
Emergency Department Attending Supervision Note  11/14/2018  10:50 PM      I evaluated this patient in conjunction with [place] Minor DNP.     Briefly, the patient presented with left wrist pain following a slip and fall with fall on outstretched left hand. She also believes she struck her left elbow. She reports severe wrist pain and some tingling to the medial 3 three fingers. She has no other concerns or injuries.    On my exam, patient appears well, but anxious. She has unremarkable vitals. Left radial pulse is 2+. There is tenderness to palpation of the left wrist with mild edema. She has decreased ROM due to pain, but there is no evidence of radial, ulnar, or median nerve injury. Sensation intact to light touch throughout with capillary refill <3 seconds. Compartments soft.    Results: Left elbow XR negative.    Left wrist XR with distal radius fracture without significant displacement.    ED course: Patient was given morphine for pain control. We discussed results of XR and I offered hematoma block for further pain management. Ultimately, patient declined. She has no evidence of neurovascular compromise or compartment syndrome and a reassuring exam. She reports tingling to digits 3-5 which may be 2/2 ulnar nerve contusion as she did hit her elbow. She was placed in a short arm volar splint and provided prescription for Norco. She was instructed to follow up with [facility] and BRANDT Minor provided return precautions.     My impression is:  1. Mechanical slip and fall with FOOSH  2. Closed fracture of distal left radius  3. Likely ulnar nerve contusion    Diagnosis    ICD-10-CM    1. Closed fracture of distal end of left radius, unspecified fracture morphology, initial encounter S52.502A     There is a fracture of the distal radius. No significant displacement or angulation is seen            Madelin Andersen MD  11/15/18 2051

## 2018-12-10 ENCOUNTER — TELEPHONE (OUTPATIENT)
Dept: FAMILY MEDICINE | Facility: CLINIC | Age: 58
End: 2018-12-10

## 2018-12-10 NOTE — TELEPHONE ENCOUNTER
Pt called in requesting a medication before she leaves for [facility] tomorrow- pt was asking for an Rx just in case she gets sick    PT has not been seen since January of 2018 and has no re established care with a provider since Dr. Lima left, after speaking with Triage I informed the PT that in order for her to get an Rx she would need to be seen.     Pt disconnected the call

## 2019-10-16 ENCOUNTER — OFFICE VISIT (OUTPATIENT)
Dept: URGENT CARE | Facility: URGENT CARE | Age: 59
End: 2019-10-16
Payer: COMMERCIAL

## 2019-10-16 ENCOUNTER — TELEPHONE (OUTPATIENT)
Dept: URGENT CARE | Facility: URGENT CARE | Age: 59
End: 2019-10-16

## 2019-10-16 VITALS
BODY MASS INDEX: 22.33 KG/M2 | HEART RATE: 60 BPM | RESPIRATION RATE: 16 BRPM | OXYGEN SATURATION: 99 % | TEMPERATURE: 97.7 F | HEIGHT: 65 IN | WEIGHT: 134 LBS | DIASTOLIC BLOOD PRESSURE: 82 MMHG | SYSTOLIC BLOOD PRESSURE: 140 MMHG

## 2019-10-16 DIAGNOSIS — N39.0 URINARY TRACT INFECTION WITH HEMATURIA, SITE UNSPECIFIED: Primary | ICD-10-CM

## 2019-10-16 DIAGNOSIS — N39.0 URINARY TRACT INFECTION WITH HEMATURIA, SITE UNSPECIFIED: ICD-10-CM

## 2019-10-16 DIAGNOSIS — R31.9 URINARY TRACT INFECTION WITH HEMATURIA, SITE UNSPECIFIED: ICD-10-CM

## 2019-10-16 DIAGNOSIS — R31.9 URINARY TRACT INFECTION WITH HEMATURIA, SITE UNSPECIFIED: Primary | ICD-10-CM

## 2019-10-16 DIAGNOSIS — R30.0 DYSURIA: Primary | ICD-10-CM

## 2019-10-16 DIAGNOSIS — R82.90 NONSPECIFIC FINDING ON EXAMINATION OF URINE: ICD-10-CM

## 2019-10-16 LAB
ALBUMIN UR-MCNC: 100 MG/DL
AMORPH CRY #/AREA URNS HPF: ABNORMAL /HPF
APPEARANCE UR: ABNORMAL
BACTERIA #/AREA URNS HPF: ABNORMAL /HPF
BILIRUB UR QL STRIP: ABNORMAL
COLOR UR AUTO: ABNORMAL
GLUCOSE UR STRIP-MCNC: 100 MG/DL
HGB UR QL STRIP: ABNORMAL
KETONES UR STRIP-MCNC: NEGATIVE MG/DL
LEUKOCYTE ESTERASE UR QL STRIP: ABNORMAL
NITRATE UR QL: POSITIVE
NON-SQ EPI CELLS #/AREA URNS LPF: ABNORMAL /LPF
PH UR STRIP: 7 PH (ref 5–7)
RBC #/AREA URNS AUTO: >100 /HPF
SOURCE: ABNORMAL
SP GR UR STRIP: 1.02 (ref 1–1.03)
UROBILINOGEN UR STRIP-ACNC: 2 EU/DL (ref 0.2–1)
WBC #/AREA URNS AUTO: ABNORMAL /HPF

## 2019-10-16 PROCEDURE — 87088 URINE BACTERIA CULTURE: CPT | Performed by: FAMILY MEDICINE

## 2019-10-16 PROCEDURE — 81001 URINALYSIS AUTO W/SCOPE: CPT | Performed by: PHYSICIAN ASSISTANT

## 2019-10-16 PROCEDURE — 99213 OFFICE O/P EST LOW 20 MIN: CPT | Performed by: FAMILY MEDICINE

## 2019-10-16 PROCEDURE — 87086 URINE CULTURE/COLONY COUNT: CPT | Performed by: FAMILY MEDICINE

## 2019-10-16 PROCEDURE — 87186 SC STD MICRODIL/AGAR DIL: CPT | Performed by: FAMILY MEDICINE

## 2019-10-16 RX ORDER — SULFAMETHOXAZOLE/TRIMETHOPRIM 800-160 MG
1 TABLET ORAL 2 TIMES DAILY
Qty: 20 TABLET | Refills: 0 | Status: SHIPPED | OUTPATIENT
Start: 2019-10-16 | End: 2019-10-21

## 2019-10-16 RX ORDER — FLUOROMETHOLONE 0.1 %
SUSPENSION, DROPS(FINAL DOSAGE FORM)(ML) OPHTHALMIC (EYE)
Refills: 0 | COMMUNITY
Start: 2019-07-15

## 2019-10-16 ASSESSMENT — MIFFLIN-ST. JEOR: SCORE: 1178.7

## 2019-10-16 NOTE — TELEPHONE ENCOUNTER
Please inform pt that she does have a UTI and rx was sent to her Pharmacy.  She had to leave b4 results.

## 2019-10-16 NOTE — PROGRESS NOTES
"SUBJECTIVE: Angi Ramos is a 58 year old female who  presents today for a possible UTI.   Symptoms of dysuria and frequency have been going on for 1day(s).    Hematuria no.  sudden onset and still presentand moderate.    There is no history of fever, chills, nausea or vomiting.   This patient does have a history of urinary tract infections.   Patient denies long duration and flank pain    No past medical history on file.  Allergies   Allergen Reactions     Ciprofloxacin Cramps     Erythromycin Other (See Comments)     Penicillins      Amoxicillin Rash     Clindamycin Hcl Rash     Social History     Tobacco Use     Smoking status: Never Smoker     Smokeless tobacco: Never Used   Substance Use Topics     Alcohol use: Yes     Comment: 1 glass of wine a day       ROS: CONSTITUTIONAL:NEGATIVE for fever, chills, change in weight    OBJECTIVE:  BP (!) 140/82 (BP Location: Right arm, Patient Position: Sitting, Cuff Size: Adult Regular)   Pulse 60   Temp 97.7  F (36.5  C) (Oral)   Resp 16   Ht 1.651 m (5' 5\")   Wt 60.8 kg (134 lb)   LMP 10/10/2016   SpO2 99%   BMI 22.30 kg/m      No Flank/abd pain      ICD-10-CM    1. Dysuria R30.0 UA with Microscopic reflex to Culture       Drink plenty of fluids.  Prevention and treatment of UTI's discussed.Signs and symptoms of pyelonephritis mentioned.  Follow up with primary care physician if not improving    "

## 2019-10-18 LAB
BACTERIA SPEC CULT: ABNORMAL
SPECIMEN SOURCE: ABNORMAL

## 2023-02-19 ENCOUNTER — OFFICE VISIT (OUTPATIENT)
Dept: URGENT CARE | Facility: URGENT CARE | Age: 63
End: 2023-02-19
Payer: COMMERCIAL

## 2023-02-19 VITALS
RESPIRATION RATE: 16 BRPM | SYSTOLIC BLOOD PRESSURE: 142 MMHG | TEMPERATURE: 98.4 F | HEART RATE: 67 BPM | DIASTOLIC BLOOD PRESSURE: 80 MMHG | OXYGEN SATURATION: 97 %

## 2023-02-19 DIAGNOSIS — J01.00 ACUTE NON-RECURRENT MAXILLARY SINUSITIS: Primary | ICD-10-CM

## 2023-02-19 PROCEDURE — 99203 OFFICE O/P NEW LOW 30 MIN: CPT | Performed by: PHYSICIAN ASSISTANT

## 2023-02-19 RX ORDER — DOXYCYCLINE 100 MG/1
100 CAPSULE ORAL 2 TIMES DAILY
Qty: 20 CAPSULE | Refills: 0 | Status: SHIPPED | OUTPATIENT
Start: 2023-02-19 | End: 2023-03-01

## 2023-02-19 NOTE — PROGRESS NOTES
URGENT CARE VISIT:    SUBJECTIVE:   Angi Ramos is a 62 year old female presenting with a chief complaint of stuffy nose, facial pain/pressure and headache.  Onset was 1 week(s) ago.   She denies the following symptoms: cough - productive  Course of illness is worsening.    Treatment measures tried include humidifier with no relief of symptoms.  Predisposing factors include None.    PMH: History reviewed. No pertinent past medical history.  Allergies: Ciprofloxacin, Erythromycin, Penicillins, Amoxicillin, and Clindamycin hcl   Medications:   Current Outpatient Medications   Medication Sig Dispense Refill     docusate sodium (COLACE) 100 MG tablet Take 100 mg by mouth daily Take while using Norco 60 tablet 1     doxycycline hyclate (VIBRAMYCIN) 100 MG capsule Take 1 capsule (100 mg) by mouth 2 times daily for 10 days 20 capsule 0     fluorometholone (FML LIQUIFILM) 0.1 % ophthalmic suspension SHAKE LQ AND INT 1 GTT IN OU QID UTD  0     HYDROcodone-acetaminophen (NORCO) 5-325 MG per tablet Take 1 tablet by mouth every 6 hours as needed for severe pain No more than 4000mg acetaminophen every 24 hours (Patient not taking: Reported on 10/16/2019) 10 tablet 0     Social History:   Social History     Tobacco Use     Smoking status: Never     Smokeless tobacco: Never   Substance Use Topics     Alcohol use: Yes     Comment: 1 glass of wine a day       ROS:  Review of systems negative except as stated above.    OBJECTIVE:  BP (!) 142/80   Pulse 67   Temp 98.4  F (36.9  C) (Tympanic)   Resp 16   LMP 10/10/2016   SpO2 97%   GENERAL APPEARANCE: healthy, alert and no distress  EYES: EOMI,  PERRL, conjunctiva clear  HENT: ear canals and TM's normal.  Nose and mouth without ulcers, erythema or lesions  NECK: supple, nontender, no lymphadenopathy  RESP: lungs clear to auscultation - no rales, rhonchi or wheezes  CV: regular rates and rhythm, normal S1 S2, no murmur noted  SKIN: no suspicious lesions or  lobito      ASSESSMENT:    ICD-10-CM    1. Acute non-recurrent maxillary sinusitis  J01.00 doxycycline hyclate (VIBRAMYCIN) 100 MG capsule          PLAN:  Patient Instructions   Patient was educated on the natural course of condition.  Take medications as prescribed. Side effects may include stomachache or diarrhea. Conservative measures discussed including increased fluids, nasal saline irrigation (neti pot), warm steamy shower, cough suppressants, expectorants (Mucinex), and analgesics (Tylenol and/or Ibuprofen). See your primary care provider if symptoms worsen or do not improve in 7 days. Seek emergency care if you develop fever over 103 or shortness of breath.   Patient verbalized understanding and is agreeable to plan. The patient was discharged ambulatory and in stable condition.    Harper Salas PA-C ....................  2/19/2023   1:08 PM

## 2023-02-19 NOTE — PATIENT INSTRUCTIONS
Patient was educated on the natural course of condition.  Take medications as prescribed. Side effects may include stomachache or diarrhea. Conservative measures discussed including increased fluids, nasal saline irrigation (neti pot), warm steamy shower, cough suppressants, expectorants (Mucinex), and analgesics (Tylenol and/or Ibuprofen). See your primary care provider if symptoms worsen or do not improve in 7 days. Seek emergency care if you develop fever over 103 or shortness of breath.

## 2023-03-08 ENCOUNTER — TRANSFERRED RECORDS (OUTPATIENT)
Dept: HEALTH INFORMATION MANAGEMENT | Facility: CLINIC | Age: 63
End: 2023-03-08

## 2024-03-24 ENCOUNTER — HOSPITAL ENCOUNTER (EMERGENCY)
Facility: CLINIC | Age: 64
Discharge: HOME | End: 2024-03-24
Attending: EMERGENCY MEDICINE | Admitting: EMERGENCY MEDICINE
Payer: COMMERCIAL

## 2024-03-24 ENCOUNTER — APPOINTMENT (OUTPATIENT)
Dept: CT IMAGING | Facility: CLINIC | Age: 64
End: 2024-03-24
Attending: EMERGENCY MEDICINE
Payer: COMMERCIAL

## 2024-03-24 VITALS
SYSTOLIC BLOOD PRESSURE: 132 MMHG | HEIGHT: 65 IN | HEART RATE: 81 BPM | OXYGEN SATURATION: 98 % | WEIGHT: 137 LBS | DIASTOLIC BLOOD PRESSURE: 78 MMHG | RESPIRATION RATE: 18 BRPM | BODY MASS INDEX: 22.82 KG/M2 | TEMPERATURE: 96.8 F

## 2024-03-24 DIAGNOSIS — R07.9 ACUTE CHEST PAIN: ICD-10-CM

## 2024-03-24 DIAGNOSIS — M54.6 ACUTE MIDLINE THORACIC BACK PAIN: ICD-10-CM

## 2024-03-24 DIAGNOSIS — R91.1 PULMONARY NODULE: ICD-10-CM

## 2024-03-24 LAB
ALBUMIN SERPL BCG-MCNC: 4.3 G/DL (ref 3.5–5.2)
ALP SERPL-CCNC: 93 U/L (ref 40–150)
ALT SERPL W P-5'-P-CCNC: 22 U/L (ref 0–50)
ANION GAP SERPL CALCULATED.3IONS-SCNC: 16 MMOL/L (ref 7–15)
AST SERPL W P-5'-P-CCNC: 21 U/L (ref 0–45)
ATRIAL RATE - MUSE: 76 BPM
BASOPHILS # BLD AUTO: 0 10E3/UL (ref 0–0.2)
BASOPHILS NFR BLD AUTO: 0 %
BILIRUB SERPL-MCNC: 0.3 MG/DL
BUN SERPL-MCNC: 10.8 MG/DL (ref 8–23)
CALCIUM SERPL-MCNC: 9 MG/DL (ref 8.8–10.2)
CHLORIDE SERPL-SCNC: 95 MMOL/L (ref 98–107)
CREAT SERPL-MCNC: 0.68 MG/DL (ref 0.51–0.95)
DEPRECATED HCO3 PLAS-SCNC: 20 MMOL/L (ref 22–29)
DIASTOLIC BLOOD PRESSURE - MUSE: NORMAL MMHG
EGFRCR SERPLBLD CKD-EPI 2021: >90 ML/MIN/1.73M2
EOSINOPHIL # BLD AUTO: 0 10E3/UL (ref 0–0.7)
EOSINOPHIL NFR BLD AUTO: 0 %
ERYTHROCYTE [DISTWIDTH] IN BLOOD BY AUTOMATED COUNT: 13.4 % (ref 10–15)
GLUCOSE SERPL-MCNC: 126 MG/DL (ref 70–99)
HCT VFR BLD AUTO: 39.4 % (ref 35–47)
HGB BLD-MCNC: 13.4 G/DL (ref 11.7–15.7)
HOLD SPECIMEN: NORMAL
IMM GRANULOCYTES # BLD: 0 10E3/UL
IMM GRANULOCYTES NFR BLD: 0 %
INTERPRETATION ECG - MUSE: NORMAL
LIPASE SERPL-CCNC: 19 U/L (ref 13–60)
LYMPHOCYTES # BLD AUTO: 1.1 10E3/UL (ref 0.8–5.3)
LYMPHOCYTES NFR BLD AUTO: 12 %
MCH RBC QN AUTO: 29.6 PG (ref 26.5–33)
MCHC RBC AUTO-ENTMCNC: 34 G/DL (ref 31.5–36.5)
MCV RBC AUTO: 87 FL (ref 78–100)
MONOCYTES # BLD AUTO: 0.6 10E3/UL (ref 0–1.3)
MONOCYTES NFR BLD AUTO: 7 %
NEUTROPHILS # BLD AUTO: 7.3 10E3/UL (ref 1.6–8.3)
NEUTROPHILS NFR BLD AUTO: 81 %
NRBC # BLD AUTO: 0 10E3/UL
NRBC BLD AUTO-RTO: 0 /100
NT-PROBNP SERPL-MCNC: 75 PG/ML (ref 0–900)
P AXIS - MUSE: 44 DEGREES
PLATELET # BLD AUTO: 362 10E3/UL (ref 150–450)
POTASSIUM SERPL-SCNC: 4 MMOL/L (ref 3.4–5.3)
PR INTERVAL - MUSE: 160 MS
PROT SERPL-MCNC: 7.6 G/DL (ref 6.4–8.3)
QRS DURATION - MUSE: 86 MS
QT - MUSE: 366 MS
QTC - MUSE: 411 MS
R AXIS - MUSE: 85 DEGREES
RBC # BLD AUTO: 4.53 10E6/UL (ref 3.8–5.2)
SODIUM SERPL-SCNC: 131 MMOL/L (ref 135–145)
SYSTOLIC BLOOD PRESSURE - MUSE: NORMAL MMHG
T AXIS - MUSE: 58 DEGREES
TROPONIN T SERPL HS-MCNC: 7 NG/L
VENTRICULAR RATE- MUSE: 76 BPM
WBC # BLD AUTO: 9 10E3/UL (ref 4–11)

## 2024-03-24 PROCEDURE — 80053 COMPREHEN METABOLIC PANEL: CPT | Performed by: EMERGENCY MEDICINE

## 2024-03-24 PROCEDURE — 85025 COMPLETE CBC W/AUTO DIFF WBC: CPT | Performed by: EMERGENCY MEDICINE

## 2024-03-24 PROCEDURE — 250N000011 HC RX IP 250 OP 636: Performed by: EMERGENCY MEDICINE

## 2024-03-24 PROCEDURE — 250N000013 HC RX MED GY IP 250 OP 250 PS 637: Performed by: EMERGENCY MEDICINE

## 2024-03-24 PROCEDURE — 250N000009 HC RX 250: Performed by: EMERGENCY MEDICINE

## 2024-03-24 PROCEDURE — 83880 ASSAY OF NATRIURETIC PEPTIDE: CPT | Performed by: EMERGENCY MEDICINE

## 2024-03-24 PROCEDURE — 99285 EMERGENCY DEPT VISIT HI MDM: CPT | Mod: 25

## 2024-03-24 PROCEDURE — 83690 ASSAY OF LIPASE: CPT | Performed by: EMERGENCY MEDICINE

## 2024-03-24 PROCEDURE — 71260 CT THORAX DX C+: CPT

## 2024-03-24 PROCEDURE — 96374 THER/PROPH/DIAG INJ IV PUSH: CPT | Mod: 59

## 2024-03-24 PROCEDURE — 84484 ASSAY OF TROPONIN QUANT: CPT | Performed by: EMERGENCY MEDICINE

## 2024-03-24 PROCEDURE — 93005 ELECTROCARDIOGRAM TRACING: CPT

## 2024-03-24 PROCEDURE — 36415 COLL VENOUS BLD VENIPUNCTURE: CPT | Performed by: EMERGENCY MEDICINE

## 2024-03-24 RX ORDER — OMEPRAZOLE 40 MG/1
40 CAPSULE, DELAYED RELEASE ORAL DAILY
Qty: 30 CAPSULE | Refills: 0 | Status: SHIPPED | OUTPATIENT
Start: 2024-03-24 | End: 2024-04-23

## 2024-03-24 RX ORDER — MAGNESIUM HYDROXIDE/ALUMINUM HYDROXICE/SIMETHICONE 120; 1200; 1200 MG/30ML; MG/30ML; MG/30ML
15 SUSPENSION ORAL ONCE
Status: COMPLETED | OUTPATIENT
Start: 2024-03-24 | End: 2024-03-24

## 2024-03-24 RX ORDER — IOPAMIDOL 755 MG/ML
72 INJECTION, SOLUTION INTRAVASCULAR ONCE
Status: COMPLETED | OUTPATIENT
Start: 2024-03-24 | End: 2024-03-24

## 2024-03-24 RX ADMIN — IOPAMIDOL 72 ML: 755 INJECTION, SOLUTION INTRAVENOUS at 04:43

## 2024-03-24 RX ADMIN — ALUMINUM HYDROXIDE, MAGNESIUM HYDROXIDE, AND DIMETHICONE 15 ML: 200; 20; 200 SUSPENSION ORAL at 05:43

## 2024-03-24 RX ADMIN — SODIUM CHLORIDE 80 ML: 9 INJECTION, SOLUTION INTRAVENOUS at 04:43

## 2024-03-24 RX ADMIN — FAMOTIDINE 20 MG: 10 INJECTION, SOLUTION INTRAVENOUS at 05:44

## 2024-03-24 ASSESSMENT — ACTIVITIES OF DAILY LIVING (ADL)
ADLS_ACUITY_SCORE: 35

## 2024-03-24 ASSESSMENT — COLUMBIA-SUICIDE SEVERITY RATING SCALE - C-SSRS
1. IN THE PAST MONTH, HAVE YOU WISHED YOU WERE DEAD OR WISHED YOU COULD GO TO SLEEP AND NOT WAKE UP?: NO
6. HAVE YOU EVER DONE ANYTHING, STARTED TO DO ANYTHING, OR PREPARED TO DO ANYTHING TO END YOUR LIFE?: NO
2. HAVE YOU ACTUALLY HAD ANY THOUGHTS OF KILLING YOURSELF IN THE PAST MONTH?: NO

## 2024-03-24 NOTE — ED PROVIDER NOTES
"  History     Chief Complaint:  Chest Pain       HPI   Angi Ramos is a 63 year old female with history of anxiety and hyperlipidemia who presents to the ED for evaluation of chest pain. Angi reports onset of chest pain radiating to the right shoulder and pain between the shoulder blades around 1700 last night. She states that she was sleeping all day and woke up at 1700 with the pain. It has gotten worse since that time and is exacerbated when lying down. She did not eat anything but tried to drink water with apple cider vinegar. Reports taking 2 omeprazole with no relief. Denies previous medical problems, tobacco use, or recent illness.        Independent Historian:   None - Patient Only    Review of External Notes:   None      Medications:    Fluorometholone   Norco     Past Medical History:    Pulmonary nodule  Anxiety   COVID-19  Hiatal hernia  Hyperlipidemia   Menorrhagia  Seasonal allergies  UTI    Past Surgical History:    [place] teeth extraction  Abdominoplasty  Breast augmentation   Dilation and curettage   Fallopian tube ligation     Physical Exam   Patient Vitals for the past 24 hrs:   BP Temp Temp src Pulse Resp SpO2 Height Weight   03/24/24 0605 132/78 -- -- 81 18 98 % -- --   03/24/24 0322 (!) 137/97 96.8  F (36  C) Temporal 82 16 99 % 1.651 m (5' 5\") 62.1 kg (137 lb)        Physical Exam  General: Well-nourished, appears uncomfortable  Eyes: PERRL, conjunctivae pink no scleral icterus or conjunctival injection  ENT:  Moist mucus membranes, posterior oropharynx clear without erythema or exudates  Respiratory:  Lungs clear to auscultation bilaterally, no crackles/rubs/wheezes.  Good air movement  CV: Normal rate and rhythm, no murmurs/rubs/gallops  GI:  Abdomen soft and non-distended.  Normoactive BS.  No tenderness, guarding or rebound  Skin: Warm, dry.  No rashes or petechiae  Musculoskeletal: No peripheral edema or calf tenderness  Neuro: Alert and oriented to " person/place/time  Psychiatric: Normal affect      Emergency Department Course   ECG  ECG taken at 0318, ECG read at 0339 by Dana Perez MD  Normal sinus rhythm  Possible Anterior infarct, age undetermined    Rate 76 bpm. CT interval 160 ms. QRS duration 86 ms. QT/QTc 366/411 ms. P-R-T axes 44 85 58.     Imaging:  CT Aortic Survey w Contrast   Final Result   IMPRESSION:    1.  No evidence of thoracoabdominal aortic aneurysm or dissection.   2.  A few scattered pulmonary nodules including 3 mm left upper lobe nodule, as per Fleischner Society criteria, for low-risk patient, no routine follow-up is recommended and for high-risk patient, optional chest CT in 12 months can be considered.            Echo Stress Echocardiogram    (Results Pending)          Laboratory:  Labs Ordered and Resulted from Time of ED Arrival to Time of ED Departure   COMPREHENSIVE METABOLIC PANEL - Abnormal       Result Value    Sodium 131 (*)     Potassium 4.0      Carbon Dioxide (CO2) 20 (*)     Anion Gap 16 (*)     Urea Nitrogen 10.8      Creatinine 0.68      GFR Estimate >90      Calcium 9.0      Chloride 95 (*)     Glucose 126 (*)     Alkaline Phosphatase 93      AST 21      ALT 22      Protein Total 7.6      Albumin 4.3      Bilirubin Total 0.3     TROPONIN T, HIGH SENSITIVITY - Normal    Troponin T, High Sensitivity 7     LIPASE - Normal    Lipase 19     NT PROBNP INPATIENT - Normal    N terminal Pro BNP Inpatient 75     CBC WITH PLATELETS AND DIFFERENTIAL    WBC Count 9.0      RBC Count 4.53      Hemoglobin 13.4      Hematocrit 39.4      MCV 87      MCH 29.6      MCHC 34.0      RDW 13.4      Platelet Count 362      % Neutrophils 81      % Lymphocytes 12      % Monocytes 7      % Eosinophils 0      % Basophils 0      % Immature Granulocytes 0      NRBCs per 100 WBC 0      Absolute Neutrophils 7.3      Absolute Lymphocytes 1.1      Absolute Monocytes 0.6      Absolute Eosinophils 0.0      Absolute Basophils 0.0      Absolute Immature  Granulocytes 0.0      Absolute NRBCs 0.0          Procedures   None     Emergency Department Course & Assessments:    Interventions:  Medications   famotidine (PEPCID) injection 20 mg (20 mg Intravenous $Given 3/24/24 1367)   iopamidol (ISOVUE-370) solution 72 mL (72 mLs Intravenous $Given 3/24/24 9103)   sodium chloride 0.9 % CT scan flush use (80 mLs Intravenous $Given 3/24/24 7503)   alum & mag hydroxide-simethicone (MAALOX) suspension 15 mL (15 mLs Oral $Given 3/24/24 3843)        Assessments:  0340 I obtained patient history and performed a physical exam.     Independent Interpretation (X-rays, CTs, rhythm strip):  None    Consultations/Discussion of Management or Tests:  None        Social Determinants of Health affecting care:   None    Disposition:  The patient was discharged.     Impression & Plan      Medical Decision Making:    HEART Score  Criteria   0-2 points for each of 5 items (maximum of 10 points):  Score 0- History slightly suspicious for coronary syndrome  Score 0- EKG Normal  Score 2- Age 65 years or older  Score 1- One to 2 risk factors for atherosclerotic disease  Score 0- Within normal limits for troponin levels  Interpretation  Risk of adverse outcome  Heart Score: 3  Total Score 0-3- Adverse Outcome Risk 2.5% - Supports early discharge with appropriate follow-up    Angi Ramos is a 63 year old female presented with chest pain.  I was initially concerned about the possibility of an aortic emergency given the pain going to the mid scapular region and radiating up.  CTA of the chest was thus indicated and obtained.  Fortunately, no signs of aortic dissection.  It showed some incidental pulmonary nodules which were discussed with her and recommended that she follow-up in a years time regarding these.  Initial laboratory and imaging tests have come back normal.  Troponin is negative despite the duration of symptoms. The patients symptoms have improved with interventions in the  Emergency Department. There is no clinical, laboratory, or radiographic evidence of pulmonary embolism, aortic dissection or cardiac ischemia.  Will start empiric treatment for possible GERD.  However, she does have risk factors for coronary artery disease and we did discuss obtaining appropriate outpatient stress testing.  She has agreed to follow-up with the stress test and primary doctor and return if any worsening.        Diagnosis:    ICD-10-CM    1. Acute midline thoracic back pain  M54.6       2. Pulmonary nodule  R91.1     left upper lobe nodule seen on CT 3/24/24, recommend 1 year repeat CT      3. Acute chest pain  R07.9 Echo Stress Echocardiogram     Adult Cardiology Eval  Referral           Discharge Medications:  Discharge Medication List as of 3/24/2024  5:59 AM        START taking these medications    Details   omeprazole (PRILOSEC) 40 MG DR capsule Take 1 capsule (40 mg) by mouth daily for 30 days, Disp-30 capsule, R-0, Local Print                Scribe Disclosure:  Lourdes PENN, am serving as a scribe at 3:45 AM on 3/24/2024 to document services personally performed by Dana Perez MD based on my observations and the provider's statements to me.   3/24/2024   Dana Perez MD Cho, Amy C, MD  03/24/24 9859

## 2024-03-24 NOTE — ED TRIAGE NOTES
Chest pain with right shoulder and pain between the shoulder blades since 1700 yesterday. States she has had similar pain in the past due to acid reflux and she also sees a chiropractor for similar pain.      Triage Assessment (Adult)       Row Name 03/24/24 0323          Triage Assessment    Airway WDL WDL        Respiratory WDL    Respiratory WDL WDL        Skin Circulation/Temperature WDL    Skin Circulation/Temperature WDL WDL        Cardiac WDL    Cardiac WDL X;chest pain

## 2024-03-24 NOTE — DISCHARGE INSTRUCTIONS
*You may resume diet and activities as tolerated.  *Omeprazole as directed.   *Follow-up with your doctor for a recheck in 2-3 days.  Follow-up for your outpatient stress test in the next 3 days.  *Return if you develop difficulty in breathing, faint or feel like you will faint or become worse in any way.    Discharge Instructions  Chest Pain    You have been seen today for chest pain or discomfort.  At this time, your doctor has found no signs that your chest pain is due to a serious or life-threatening condition, (or you have declined more testing and/or admission to the hospital). However, sometimes there is a serious problem that does not show up right away. Your evaluation today may not be complete and you may need further testing and evaluation.     You need to follow-up with your regular doctor within 3 days.    Return to the Emergency Department if:  Your chest pain changes, gets worse, starts to happen more often, or comes with less activity.  You are short of breath.  You get very weak or tired.  You pass out or faint.  You have any new symptoms, like fever, cough, numb legs, or you cough up blood.  You have anything else that worries you.    Until you follow-up with your regular doctor please do the following:  Take one aspirin daily unless you have an allergy or are told not to by your doctor.  If a stress test appointment has been made, go to the appointment.  If you have questions, contact your regular doctor.    If your doctor today has told you to follow-up with your regular doctor, it is very important that you make an appointment with your clinic and go to the appointment.  If you do not follow-up with your primary doctor, it may result in missing an important development which could result in permanent injury or disability and/or lasting pain.  If there is any problem keeping your appointment, call your doctor or return to the Emergency Department.    If you were given a prescription for medicine  here today, be sure to read all of the information (including the package insert) that comes with your prescription.  This will include important information about the medicine, its side effects, and any warnings that you need to know about.  The pharmacist who fills the prescription can provide more information and answer questions you may have about the medicine.  If you have questions or concerns that the pharmacist cannot address, please call or return to the Emergency Department.     Opioid Medication Information    Pain medications are among the most commonly prescribed medicines, so we are including this information for all our patients. If you did not receive pain medication or get a prescription for pain medicine, you can ignore it.     You may have been given a prescription for an opioid (narcotic) pain medicine and/or have received a pain medicine while here in the Emergency Department. These medicines can make you drowsy or impaired. You must not drive, operate dangerous equipment, or engage in any other dangerous activities while taking these medications. If you drive while taking these medications, you could be arrested for DUI, or driving under the influence. Do not drink any alcohol while you are taking these medications.     Opioid pain medications can cause addiction. If you have a history of chemical dependency of any type, you are at a higher risk of becoming addicted to pain medications.  Only take these prescribed medications to treat your pain when all other options have been tried. Take it for as short a time and as few doses as possible. Store your pain pills in a secure place, as they are frequently stolen and provide a dangerous opportunity for children or visitors in your house to start abusing these powerful medications. We will not replace any lost or stolen medicine.  As soon as your pain is better, you should flush all your remaining medication.     Many prescription pain medications  contain Tylenol  (acetaminophen), including Vicodin , Tylenol #3 , Norco , Lortab , and Percocet .  You should not take any extra pills of Tylenol  if you are using these prescription medications or you can get very sick.  Do not ever take more than 3000 mg of acetaminophen in any 24 hour period.    All opioids tend to cause constipation. Drink plenty of water and eat foods that have a lot of fiber, such as fruits, vegetables, prune juice, apple juice and high fiber cereal.  Take a laxative if you don t move your bowels at least every other day. Miralax , Milk of Magnesia, Colace , or Senna  can be used to keep you regular.      Remember that you can always come back to the Emergency Department if you are not able to see your regular doctor in the amount of time listed above, if you get any new symptoms, or if there is anything that worries you.        Discharge Instructions  Back Pain  You were seen today for back pain. Back pain can have many causes, but most will get better without surgery or other specific treatment. Sometimes there is a herniated ( slipped ) disc. We don t usually do MRI scans to look for these right away, since most herniated discs will get better on their own with time.  Today, we did not find any evidence that your back pain was caused by a serious condition, such as an infection, fracture, or tumor. However, sometimes symptoms develop over time and cannot be found during an emergency visit, so it is very important that you follow up with your primary doctor.  Return to the Emergency Department if:  You develop a fever with your back pain.   You have weakness or change in sensation in one or both legs.  You lose control of your bowels or bladder, or can t empty your bladder.  Your pain gets much worse.     Follow-up with your doctor:  Unless your pain has completely gone away, please make an appointment with your doctor within one week.  You may need further management of your back pain,  such as more pain medication, imaging such as an X-ray or MRI, or physical therapy.    What can I do to help myself?  Remain Active -- People are often afraid that they will hurt their back further or delay recovery by remaining active, but this is one of the best things you can do for your back. In fact, prolonged bed rest is not recommended. Studies have shown that people with low back pain recover faster when they remain active. Movement helps to bring blood flow to the muscles and relieve muscle spasms as well as preventing loss of muscle strength.  Heat -- Using a heating pad can help with low back pain during the first few weeks. Do not sleep with a heating pad, as you can be burned.   Pain medications - You may take a pain medication such as Tylenol  (acetaminophen), Advil , Nuprin  (ibuprofen) or Aleve  (naproxen).  If you have been given a narcotic such as Vicodin  (hydrocodone with acetaminophen), Percocet  (oxycodone with acetaminophen), codeine, or a muscle relaxant such as Flexeril  (cyclobenzaprine) or Soma  (carisoprodol), do not drive for four hours after you have taken it. If the narcotic contains Tylenol  (acetaminophen), do not take Tylenol  with it. All narcotics will cause constipation, so eat a high fiber diet.   If you were given a prescription for medicine here today, be sure to read all of the information (including the package insert) that comes with your prescription.  This will include important information about the medicine, its side effects, and any warnings that you need to know about.  The pharmacist who fills the prescription can provide more information and answer questions you may have about the medicine.  If you have questions or concerns that the pharmacist cannot address, please call or return to the Emergency Department.   Opioid Medication Information    Pain medications are among the most commonly prescribed medicines, so we are including this information for all our  patients. If you did not receive pain medication or get a prescription for pain medicine, you can ignore it.     You may have been given a prescription for an opioid (narcotic) pain medicine and/or have received a pain medicine while here in the Emergency Department. These medicines can make you drowsy or impaired. You must not drive, operate dangerous equipment, or engage in any other dangerous activities while taking these medications. If you drive while taking these medications, you could be arrested for DUI, or driving under the influence. Do not drink any alcohol while you are taking these medications.     Opioid pain medications can cause addiction. If you have a history of chemical dependency of any type, you are at a higher risk of becoming addicted to pain medications.  Only take these prescribed medications to treat your pain when all other options have been tried. Take it for as short a time and as few doses as possible. Store your pain pills in a secure place, as they are frequently stolen and provide a dangerous opportunity for children or visitors in your house to start abusing these powerful medications. We will not replace any lost or stolen medicine.  As soon as your pain is better, you should flush all your remaining medication.     Many prescription pain medications contain Tylenol  (acetaminophen), including Vicodin , Tylenol #3 , Norco , Lortab , and Percocet .  You should not take any extra pills of Tylenol  if you are using these prescription medications or you can get very sick.  Do not ever take more than 3000 mg of acetaminophen in any 24 hour period.    All opioids tend to cause constipation. Drink plenty of water and eat foods that have a lot of fiber, such as fruits, vegetables, prune juice, apple juice and high fiber cereal.  Take a laxative if you don t move your bowels at least every other day. Miralax , Milk of Magnesia, Colace , or Senna  can be used to keep you regular.       Remember that you can always come back to the Emergency Department if you are not able to see your regular doctor in the amount of time listed above, if you get any new symptoms, or if there is anything that worries you.

## 2024-03-29 ENCOUNTER — HOSPITAL ENCOUNTER (OUTPATIENT)
Dept: CARDIOLOGY | Facility: CLINIC | Age: 64
Discharge: HOME | End: 2024-03-29
Attending: EMERGENCY MEDICINE | Admitting: EMERGENCY MEDICINE
Payer: COMMERCIAL

## 2024-03-29 ENCOUNTER — TELEPHONE (OUTPATIENT)
Dept: CARDIOLOGY | Facility: CLINIC | Age: 64
End: 2024-03-29

## 2024-03-29 DIAGNOSIS — R07.9 ACUTE CHEST PAIN: Primary | ICD-10-CM

## 2024-03-29 PROCEDURE — 93016 CV STRESS TEST SUPVJ ONLY: CPT | Performed by: INTERNAL MEDICINE

## 2024-03-29 PROCEDURE — 93325 DOPPLER ECHO COLOR FLOW MAPG: CPT | Mod: 26 | Performed by: INTERNAL MEDICINE

## 2024-03-29 PROCEDURE — 93321 DOPPLER ECHO F-UP/LMTD STD: CPT | Mod: 26 | Performed by: INTERNAL MEDICINE

## 2024-03-29 PROCEDURE — 999N000208 ECHO STRESS ECHOCARDIOGRAM

## 2024-03-29 PROCEDURE — 93350 STRESS TTE ONLY: CPT | Mod: 26 | Performed by: INTERNAL MEDICINE

## 2024-03-29 PROCEDURE — 93018 CV STRESS TEST I&R ONLY: CPT | Performed by: INTERNAL MEDICINE

## 2024-03-29 PROCEDURE — 255N000002 HC RX 255 OP 636: Performed by: EMERGENCY MEDICINE

## 2024-03-29 RX ADMIN — HUMAN ALBUMIN MICROSPHERES AND PERFLUTREN 9 ML: 10; .22 INJECTION, SOLUTION INTRAVENOUS at 14:49

## 2024-03-29 NOTE — TELEPHONE ENCOUNTER
Left a message for the patient to review that her test today showed some cardiac concerns. Reviewed to seek ED assessment if she has any return of the chest pain or symptoms that took her to the ED earlier this week. Reviewed to rest over the weekend and we will work with her at the Monday visit to set up a treatment plan.

## 2024-03-29 NOTE — TELEPHONE ENCOUNTER
Message from stress echo reader that the reading is abnormal. Patient was seen in ED on 3/24/2024 for chest pain.  ED provider ordered a stress echo and an URGENT cardiology consult. Patient scheduled to see Dr. Perales as a new patient on Monday.  Troponins negative, EKG sinus with possible infarct      Stress echo:  Abnormal stess echo  Normal rest wall motion with stress-induced wall motion abnormalities  consistent with ischemia in the anterior wall    Will message DOD Dr. Trevino as Dr. Perales is out of the clinic.    1605 attempted to contact the patient to see if she has had any more chest pain or other symptoms since the ED visit. Left message for patient to call back to Team 2 R.N.s @ [phone]

## 2024-03-31 ENCOUNTER — APPOINTMENT (OUTPATIENT)
Dept: GENERAL RADIOLOGY | Facility: CLINIC | Age: 64
End: 2024-03-31
Attending: EMERGENCY MEDICINE
Payer: COMMERCIAL

## 2024-03-31 ENCOUNTER — HOSPITAL ENCOUNTER (EMERGENCY)
Facility: CLINIC | Age: 64
Discharge: LEFT BEFORE DISCHARGE | End: 2024-03-31
Attending: EMERGENCY MEDICINE | Admitting: EMERGENCY MEDICINE
Payer: COMMERCIAL

## 2024-03-31 VITALS
TEMPERATURE: 98 F | HEART RATE: 69 BPM | OXYGEN SATURATION: 99 % | SYSTOLIC BLOOD PRESSURE: 138 MMHG | DIASTOLIC BLOOD PRESSURE: 89 MMHG | RESPIRATION RATE: 18 BRPM

## 2024-03-31 DIAGNOSIS — R07.9 CHEST PAIN, UNSPECIFIED TYPE: ICD-10-CM

## 2024-03-31 DIAGNOSIS — R94.39 ABNORMAL STRESS TEST: ICD-10-CM

## 2024-03-31 LAB
ALBUMIN SERPL BCG-MCNC: 4.2 G/DL (ref 3.5–5.2)
ALP SERPL-CCNC: 79 U/L (ref 40–150)
ALT SERPL W P-5'-P-CCNC: 31 U/L (ref 0–50)
ANION GAP SERPL CALCULATED.3IONS-SCNC: 14 MMOL/L (ref 7–15)
AST SERPL W P-5'-P-CCNC: 30 U/L (ref 0–45)
ATRIAL RATE - MUSE: 66 BPM
BASOPHILS # BLD AUTO: 0 10E3/UL (ref 0–0.2)
BASOPHILS NFR BLD AUTO: 0 %
BILIRUB SERPL-MCNC: 0.3 MG/DL
BUN SERPL-MCNC: 12.9 MG/DL (ref 8–23)
CALCIUM SERPL-MCNC: 9.2 MG/DL (ref 8.8–10.2)
CHLORIDE SERPL-SCNC: 103 MMOL/L (ref 98–107)
CREAT SERPL-MCNC: 0.69 MG/DL (ref 0.51–0.95)
DEPRECATED HCO3 PLAS-SCNC: 22 MMOL/L (ref 22–29)
DIASTOLIC BLOOD PRESSURE - MUSE: NORMAL MMHG
EGFRCR SERPLBLD CKD-EPI 2021: >90 ML/MIN/1.73M2
EOSINOPHIL # BLD AUTO: 0.1 10E3/UL (ref 0–0.7)
EOSINOPHIL NFR BLD AUTO: 2 %
ERYTHROCYTE [DISTWIDTH] IN BLOOD BY AUTOMATED COUNT: 13 % (ref 10–15)
GLUCOSE SERPL-MCNC: 87 MG/DL (ref 70–99)
HCT VFR BLD AUTO: 40.4 % (ref 35–47)
HGB BLD-MCNC: 13.4 G/DL (ref 11.7–15.7)
HOLD SPECIMEN: NORMAL
IMM GRANULOCYTES # BLD: 0 10E3/UL
IMM GRANULOCYTES NFR BLD: 0 %
INTERPRETATION ECG - MUSE: NORMAL
LYMPHOCYTES # BLD AUTO: 2.5 10E3/UL (ref 0.8–5.3)
LYMPHOCYTES NFR BLD AUTO: 33 %
MAGNESIUM SERPL-MCNC: 2 MG/DL (ref 1.7–2.3)
MCH RBC QN AUTO: 29.2 PG (ref 26.5–33)
MCHC RBC AUTO-ENTMCNC: 33.2 G/DL (ref 31.5–36.5)
MCV RBC AUTO: 88 FL (ref 78–100)
MONOCYTES # BLD AUTO: 0.6 10E3/UL (ref 0–1.3)
MONOCYTES NFR BLD AUTO: 8 %
NEUTROPHILS # BLD AUTO: 4.2 10E3/UL (ref 1.6–8.3)
NEUTROPHILS NFR BLD AUTO: 57 %
NRBC # BLD AUTO: 0 10E3/UL
NRBC BLD AUTO-RTO: 0 /100
NT-PROBNP SERPL-MCNC: 43 PG/ML (ref 0–900)
P AXIS - MUSE: 60 DEGREES
PLATELET # BLD AUTO: 410 10E3/UL (ref 150–450)
POTASSIUM SERPL-SCNC: 4.3 MMOL/L (ref 3.4–5.3)
PR INTERVAL - MUSE: 184 MS
PROT SERPL-MCNC: 7.5 G/DL (ref 6.4–8.3)
QRS DURATION - MUSE: 82 MS
QT - MUSE: 388 MS
QTC - MUSE: 406 MS
R AXIS - MUSE: 48 DEGREES
RBC # BLD AUTO: 4.59 10E6/UL (ref 3.8–5.2)
SODIUM SERPL-SCNC: 139 MMOL/L (ref 135–145)
SYSTOLIC BLOOD PRESSURE - MUSE: NORMAL MMHG
T AXIS - MUSE: 72 DEGREES
TROPONIN T SERPL HS-MCNC: <6 NG/L
VENTRICULAR RATE- MUSE: 66 BPM
WBC # BLD AUTO: 7.4 10E3/UL (ref 4–11)

## 2024-03-31 PROCEDURE — 80053 COMPREHEN METABOLIC PANEL: CPT | Performed by: EMERGENCY MEDICINE

## 2024-03-31 PROCEDURE — 85025 COMPLETE CBC W/AUTO DIFF WBC: CPT | Performed by: EMERGENCY MEDICINE

## 2024-03-31 PROCEDURE — 36415 COLL VENOUS BLD VENIPUNCTURE: CPT | Performed by: EMERGENCY MEDICINE

## 2024-03-31 PROCEDURE — 71046 X-RAY EXAM CHEST 2 VIEWS: CPT

## 2024-03-31 PROCEDURE — 83880 ASSAY OF NATRIURETIC PEPTIDE: CPT | Performed by: EMERGENCY MEDICINE

## 2024-03-31 PROCEDURE — 93005 ELECTROCARDIOGRAM TRACING: CPT

## 2024-03-31 PROCEDURE — 84484 ASSAY OF TROPONIN QUANT: CPT | Performed by: EMERGENCY MEDICINE

## 2024-03-31 PROCEDURE — 83735 ASSAY OF MAGNESIUM: CPT | Performed by: EMERGENCY MEDICINE

## 2024-03-31 PROCEDURE — 99285 EMERGENCY DEPT VISIT HI MDM: CPT | Mod: 25

## 2024-03-31 ASSESSMENT — ACTIVITIES OF DAILY LIVING (ADL)
ADLS_ACUITY_SCORE: 35
ADLS_ACUITY_SCORE: 35

## 2024-03-31 NOTE — ED PROVIDER NOTES
History     Chief Complaint:  Chest Pain and Eye Drainage       HPI   Angi Ramos is a 63 year old female with history of anxiety and hyperlipidemia who presents to the ED from Urgent Care with family members for evaluation of chest pain and eye drainage.  Patient has had intermittent chest pain for the last 3 months.  Initially thought it was related to acid reflux.  She states that she was seen in the ER on March 24, 2024 she had a CT aortic survey that was normal.  Her troponin was 7.  BNP is 75.  She was discharged home with outpatient stress test that was done.    Her echo stress from 3/24/2024 showed  Interpretation Summary  Abnormal stess echo  Normal rest wall motion with stress-induced wall motion abnormalities  consistent with ischemia in the anterior wall  Results called to referring physician.    Patient received a call regarding her echo, stress test results but her phone had not been working.  She noted the voicemails that notified her that if she had any changes in her symptoms to go to the ER.  She reports that today that her symptoms are more persistent.  She states that previously she had intermittent pain to her back and chest but today it was constant.  She states that the symptoms do worsen when she lays flat.  She took baby aspirin prior to arrival.  Denies any shortness of breath or difficulty breathing.  No lower extremity edema.  She was seen at urgent care but then went straight here instead.               Independent Historian:   None - Patient Only    Review of External Notes:    I reviewed Dr. Perez's 3/24/24 ED Note regarding chest pain, acute midline thoracic back pain, and pulmonary nodule.   Abnormal stress test result 3/29/24.    Medications:    Colace  Norco  Omeprazole     Past Medical History:    Anxiety  Pulmonary nodule  Seasonal allerrgies  Menorrhagia   Hiatal hernia  COVID-19  Hyperlipidemia   UTI    Past Surgical History:    [place] teeth  extraction  Abdominoplasty   Breast Augmentation   Bilateral mammaplasty   Dilation and curretage     Physical Exam   Patient Vitals for the past 24 hrs:   BP Temp Temp src Pulse Resp SpO2   03/31/24 1259 138/89 -- -- 69 -- 99 %   03/31/24 1257 138/89 98  F (36.7  C) Oral 65 18 99 %        Physical Exam  Vitals reviewed.   Constitutional:       General: She is not in acute distress.     Appearance: She is not ill-appearing.   HENT:      Head: Normocephalic and atraumatic.   Eyes:      Extraocular Movements: Extraocular movements intact.   Cardiovascular:      Rate and Rhythm: Normal rate and regular rhythm.   Pulmonary:      Effort: Pulmonary effort is normal. No respiratory distress.      Breath sounds: Normal breath sounds. No wheezing.   Abdominal:      Palpations: Abdomen is soft.      Tenderness: There is no abdominal tenderness. There is no guarding.   Musculoskeletal:      Cervical back: Normal range of motion.   Skin:     General: Skin is warm and dry.   Neurological:      Mental Status: She is alert and oriented to person, place, and time.      GCS: GCS eye subscore is 4. GCS verbal subscore is 5. GCS motor subscore is 6.   Psychiatric:         Behavior: Behavior normal.           Emergency Department Course   ECG    ECG results from 03/31/24   EKG 12 lead     Value    Systolic Blood Pressure     Diastolic Blood Pressure     Ventricular Rate 66    Atrial Rate 66    WI Interval 184    QRS Duration 82        QTc 406    P Axis 60    R AXIS 48    T Axis 72    Interpretation ECG      Sinus rhythm  Cannot rule out Anterior infarct (cited on or before 24-MAR-2024)  Abnormal ECG  Taken at 1314. Read at 1322 by Estee Landry DO.         Imaging:  XR Chest 2 Views   Final Result   IMPRESSION: Negative chest. Lungs clear.             Laboratory:  Labs Ordered and Resulted from Time of ED Arrival to Time of ED Departure   COMPREHENSIVE METABOLIC PANEL - Normal       Result Value    Sodium 139      Potassium 4.3       Carbon Dioxide (CO2) 22      Anion Gap 14      Urea Nitrogen 12.9      Creatinine 0.69      GFR Estimate >90      Calcium 9.2      Chloride 103      Glucose 87      Alkaline Phosphatase 79      AST 30      ALT 31      Protein Total 7.5      Albumin 4.2      Bilirubin Total 0.3     TROPONIN T, HIGH SENSITIVITY - Normal    Troponin T, High Sensitivity <6     MAGNESIUM - Normal    Magnesium 2.0     NT PROBNP INPATIENT - Normal    N terminal Pro BNP Inpatient 43     CBC WITH PLATELETS AND DIFFERENTIAL    WBC Count 7.4      RBC Count 4.59      Hemoglobin 13.4      Hematocrit 40.4      MCV 88      MCH 29.2      MCHC 33.2      RDW 13.0      Platelet Count 410      % Neutrophils 57      % Lymphocytes 33      % Monocytes 8      % Eosinophils 2      % Basophils 0      % Immature Granulocytes 0      NRBCs per 100 WBC 0      Absolute Neutrophils 4.2      Absolute Lymphocytes 2.5      Absolute Monocytes 0.6      Absolute Eosinophils 0.1      Absolute Basophils 0.0      Absolute Immature Granulocytes 0.0      Absolute NRBCs 0.0     TROPONIN T, HIGH SENSITIVITY        Procedures   None     Emergency Department Course & Assessments:    Interventions:  Medications - No data to display     Assessments:  130 I obtained patient history and performed a physical exam.     Independent Interpretation (X-rays, CTs, rhythm strip):  Chest x-ray shows no acute infiltrates, pneumothorax, cardiomegaly    ED Course as of 03/31/24 1646   Sun Mar 31, 2024   1300 I obtained patient history and performed a physical exam.    1411 Troponin T, High Sensitivity: <6   1411 N-Terminal Pro BNP Inpatient: 43   1428 Troponin T, High Sensitivity: <6   1434 I discussed the case with cardiology like a call back after discussion with the patient.  [phone]   1445 Discussed with cardiology regarding patient case.  He was reviewed patient's EKG as well as echocardiogram.  Patient has abnormal echocardiogram from a few days ago.  Recommends patient stay for  unstable angina and start heparin.  Patient stated multiple times that she is not staying here.  I went back to go speak to the patient she states that she is not staying here.  I discussed with her the concerns regarding the abnormal stress test as well as changes to her EKG.  At this time her troponin is less than 6 it was recommended to get a repeat troponin as well as start heparin at this time.  Patient states that she is not staying at this hospital.  States she wants to go to the [facility] right now.  Daughter states that she will drive him to the [facility].   1447 I spoke to pt at length and daughter regarding concerns including acute MI, cardiac arrest. Emphasized to pt she cannot drive. She verbalized understanding. States she understands risk and will leave AMA.        Social Determinants of Health affecting care:   None    Disposition:  Patient left AMA     Impression & Plan        Medical Decision Makin-year-old female presenting today with chest and back pain.  She was recently seen in the ER 2024 for same symptoms.  She had an echo, stress test done that showed abnormalities.  She was told that if she develops any changes in her symptoms go to the ER.  She states that over the last days she has had persistent chest pain and back pain.  She states that previously was intermittent.  She took a baby aspirin prior to arrival.  She presents today with her daughter and granddaughter at the bedside.  She is well-appearing vital signs are stable.  EKG showed no ST elevation.  Cardiac workup including troponin BNP done troponin was less than 6.  Chest x-ray as noted above.  I discussed the case with cardiology on-call they reviewed the stress test.  They recommend admitting the patient for unstable angina.  Recommend starting heparin.  I discussed with the patient the recommendations I did mention after my initial assessment regarding staying in the hospital for this as well as  "intermittent abnormal stress test as well.  Patient was adamant she was not going to stay in the hospital.  She states that she \"does not trust this hospital\".  I discussed with her my concern that there is significant abnormality seen on the stress test that may need more emergent, urgent intervention.  She states that she will not stay.  Daughter tried to reason with the patient however the patient continued to refuse to stay here.  She states that she will not stay at this hospital.  I spent over 20 minutes with her and her daughter discussing the risks of leaving the hospital and reason for admission.  She verbalized understanding.  I did discuss with her the potential risks of cardiac arrest and death.  I told her that she cannot drive.  Daughter states that she will take her directly to [facility] where she wants to get care.  Patient refused any additional evaluation here signed out AGAINST MEDICAL ADVICE.  Patient clinically sober and able to sign out against medical advice.     Diagnosis:    ICD-10-CM    1. Chest pain, unspecified type  R07.9       2. Abnormal stress test  R94.39            Discharge Medications:  Discharge Medication List as of 3/31/2024  2:59 PM             Scribe Disclosure:  I, Lourdes Tabor, am serving as a scribe at 1:58 PM on 3/31/2024 to document services personally performed by Estee Landry DO based on my observations and the provider's statements to me.   3/31/2024   Estee Landry DO Doan, Tiffani, DO  03/31/24 1647    "

## 2024-03-31 NOTE — ED NOTES
"Pt signed AMA form before leaving. Pt educated regarding risks of leaving hospital, and states \"I want to go to [place]\"   "

## 2024-03-31 NOTE — ED TRIAGE NOTES
Here with chest discomfort/heavy feeling, and back pain right between her shoulder blades, she and a stress test and US last week and was called several times to go into the ER if she has increased. Her stress test was abnormal.